# Patient Record
Sex: FEMALE | Race: OTHER | HISPANIC OR LATINO
[De-identification: names, ages, dates, MRNs, and addresses within clinical notes are randomized per-mention and may not be internally consistent; named-entity substitution may affect disease eponyms.]

---

## 2018-01-02 ENCOUNTER — TRANSCRIPTION ENCOUNTER (OUTPATIENT)
Age: 43
End: 2018-01-02

## 2018-02-03 ENCOUNTER — TRANSCRIPTION ENCOUNTER (OUTPATIENT)
Age: 43
End: 2018-02-03

## 2018-12-09 ENCOUNTER — TRANSCRIPTION ENCOUNTER (OUTPATIENT)
Age: 43
End: 2018-12-09

## 2019-03-07 ENCOUNTER — EMERGENCY (EMERGENCY)
Facility: HOSPITAL | Age: 44
LOS: 1 days | Discharge: HOME | End: 2019-03-07
Attending: EMERGENCY MEDICINE
Payer: SELF-PAY

## 2019-03-07 VITALS
WEIGHT: 119.93 LBS | OXYGEN SATURATION: 100 % | RESPIRATION RATE: 16 BRPM | HEART RATE: 74 BPM | DIASTOLIC BLOOD PRESSURE: 62 MMHG | HEIGHT: 61 IN | TEMPERATURE: 98 F | SYSTOLIC BLOOD PRESSURE: 120 MMHG

## 2019-03-07 LAB
ALBUMIN SERPL ELPH-MCNC: 4.3 G/DL — SIGNIFICANT CHANGE UP (ref 3.3–5.2)
ALP SERPL-CCNC: 60 U/L — SIGNIFICANT CHANGE UP (ref 40–120)
ALT FLD-CCNC: 13 U/L — SIGNIFICANT CHANGE UP
AMYLASE P1 CFR SERPL: 24 U/L — LOW (ref 36–128)
ANION GAP SERPL CALC-SCNC: 11 MMOL/L — SIGNIFICANT CHANGE UP (ref 5–17)
APPEARANCE UR: CLEAR — SIGNIFICANT CHANGE UP
AST SERPL-CCNC: 16 U/L — SIGNIFICANT CHANGE UP
BASOPHILS # BLD AUTO: 0 K/UL — SIGNIFICANT CHANGE UP (ref 0–0.2)
BASOPHILS NFR BLD AUTO: 0.1 % — SIGNIFICANT CHANGE UP (ref 0–2)
BILIRUB SERPL-MCNC: 0.7 MG/DL — SIGNIFICANT CHANGE UP (ref 0.4–2)
BILIRUB UR-MCNC: NEGATIVE — SIGNIFICANT CHANGE UP
BUN SERPL-MCNC: 7 MG/DL — LOW (ref 8–20)
CALCIUM SERPL-MCNC: 9.6 MG/DL — SIGNIFICANT CHANGE UP (ref 8.6–10.2)
CHLORIDE SERPL-SCNC: 105 MMOL/L — SIGNIFICANT CHANGE UP (ref 98–107)
CO2 SERPL-SCNC: 24 MMOL/L — SIGNIFICANT CHANGE UP (ref 22–29)
COLOR SPEC: YELLOW — SIGNIFICANT CHANGE UP
CREAT SERPL-MCNC: 0.53 MG/DL — SIGNIFICANT CHANGE UP (ref 0.5–1.3)
DIFF PNL FLD: NEGATIVE — SIGNIFICANT CHANGE UP
EOSINOPHIL # BLD AUTO: 0 K/UL — SIGNIFICANT CHANGE UP (ref 0–0.5)
EOSINOPHIL NFR BLD AUTO: 0.4 % — SIGNIFICANT CHANGE UP (ref 0–6)
GLUCOSE SERPL-MCNC: 90 MG/DL — SIGNIFICANT CHANGE UP (ref 70–115)
GLUCOSE UR QL: NEGATIVE MG/DL — SIGNIFICANT CHANGE UP
HCG SERPL-ACNC: <4 MIU/ML — SIGNIFICANT CHANGE UP
HCG UR QL: NEGATIVE — SIGNIFICANT CHANGE UP
HCT VFR BLD CALC: 39.1 % — SIGNIFICANT CHANGE UP (ref 37–47)
HGB BLD-MCNC: 13.1 G/DL — SIGNIFICANT CHANGE UP (ref 12–16)
KETONES UR-MCNC: NEGATIVE — SIGNIFICANT CHANGE UP
LEUKOCYTE ESTERASE UR-ACNC: NEGATIVE — SIGNIFICANT CHANGE UP
LIDOCAIN IGE QN: 16 U/L — LOW (ref 22–51)
LYMPHOCYTES # BLD AUTO: 1.7 K/UL — SIGNIFICANT CHANGE UP (ref 1–4.8)
LYMPHOCYTES # BLD AUTO: 12.6 % — LOW (ref 20–55)
MCHC RBC-ENTMCNC: 32.5 PG — HIGH (ref 27–31)
MCHC RBC-ENTMCNC: 33.5 G/DL — SIGNIFICANT CHANGE UP (ref 32–36)
MCV RBC AUTO: 97 FL — SIGNIFICANT CHANGE UP (ref 81–99)
MONOCYTES # BLD AUTO: 0.9 K/UL — HIGH (ref 0–0.8)
MONOCYTES NFR BLD AUTO: 6.5 % — SIGNIFICANT CHANGE UP (ref 3–10)
NEUTROPHILS # BLD AUTO: 10.7 K/UL — HIGH (ref 1.8–8)
NEUTROPHILS NFR BLD AUTO: 80 % — HIGH (ref 37–73)
NITRITE UR-MCNC: NEGATIVE — SIGNIFICANT CHANGE UP
PH UR: 7 — SIGNIFICANT CHANGE UP (ref 5–8)
PLATELET # BLD AUTO: 294 K/UL — SIGNIFICANT CHANGE UP (ref 150–400)
POTASSIUM SERPL-MCNC: 4 MMOL/L — SIGNIFICANT CHANGE UP (ref 3.5–5.3)
POTASSIUM SERPL-SCNC: 4 MMOL/L — SIGNIFICANT CHANGE UP (ref 3.5–5.3)
PROT SERPL-MCNC: 7.1 G/DL — SIGNIFICANT CHANGE UP (ref 6.6–8.7)
PROT UR-MCNC: NEGATIVE MG/DL — SIGNIFICANT CHANGE UP
RBC # BLD: 4.03 M/UL — LOW (ref 4.4–5.2)
RBC # FLD: 13.1 % — SIGNIFICANT CHANGE UP (ref 11–15.6)
SODIUM SERPL-SCNC: 140 MMOL/L — SIGNIFICANT CHANGE UP (ref 135–145)
SP GR SPEC: 1 — LOW (ref 1.01–1.02)
UROBILINOGEN FLD QL: NEGATIVE MG/DL — SIGNIFICANT CHANGE UP
WBC # BLD: 13.4 K/UL — HIGH (ref 4.8–10.8)
WBC # FLD AUTO: 13.4 K/UL — HIGH (ref 4.8–10.8)

## 2019-03-07 PROCEDURE — 96375 TX/PRO/DX INJ NEW DRUG ADDON: CPT

## 2019-03-07 PROCEDURE — 84702 CHORIONIC GONADOTROPIN TEST: CPT

## 2019-03-07 PROCEDURE — 99284 EMERGENCY DEPT VISIT MOD MDM: CPT | Mod: 25

## 2019-03-07 PROCEDURE — 99284 EMERGENCY DEPT VISIT MOD MDM: CPT

## 2019-03-07 PROCEDURE — 76830 TRANSVAGINAL US NON-OB: CPT | Mod: 26

## 2019-03-07 PROCEDURE — 96374 THER/PROPH/DIAG INJ IV PUSH: CPT | Mod: XU

## 2019-03-07 PROCEDURE — 82150 ASSAY OF AMYLASE: CPT

## 2019-03-07 PROCEDURE — 81003 URINALYSIS AUTO W/O SCOPE: CPT

## 2019-03-07 PROCEDURE — 76830 TRANSVAGINAL US NON-OB: CPT

## 2019-03-07 PROCEDURE — 81025 URINE PREGNANCY TEST: CPT

## 2019-03-07 PROCEDURE — 83690 ASSAY OF LIPASE: CPT

## 2019-03-07 PROCEDURE — 80053 COMPREHEN METABOLIC PANEL: CPT

## 2019-03-07 PROCEDURE — 85027 COMPLETE CBC AUTOMATED: CPT

## 2019-03-07 PROCEDURE — 96361 HYDRATE IV INFUSION ADD-ON: CPT

## 2019-03-07 PROCEDURE — 36415 COLL VENOUS BLD VENIPUNCTURE: CPT

## 2019-03-07 PROCEDURE — 76856 US EXAM PELVIC COMPLETE: CPT | Mod: 26

## 2019-03-07 PROCEDURE — 76856 US EXAM PELVIC COMPLETE: CPT

## 2019-03-07 PROCEDURE — 74177 CT ABD & PELVIS W/CONTRAST: CPT | Mod: 26

## 2019-03-07 PROCEDURE — 74177 CT ABD & PELVIS W/CONTRAST: CPT

## 2019-03-07 RX ORDER — SODIUM CHLORIDE 9 MG/ML
1000 INJECTION INTRAMUSCULAR; INTRAVENOUS; SUBCUTANEOUS ONCE
Qty: 0 | Refills: 0 | Status: COMPLETED | OUTPATIENT
Start: 2019-03-07 | End: 2019-03-07

## 2019-03-07 RX ORDER — ONDANSETRON 8 MG/1
4 TABLET, FILM COATED ORAL ONCE
Qty: 0 | Refills: 0 | Status: COMPLETED | OUTPATIENT
Start: 2019-03-07 | End: 2019-03-07

## 2019-03-07 RX ORDER — FAMOTIDINE 10 MG/ML
20 INJECTION INTRAVENOUS ONCE
Qty: 0 | Refills: 0 | Status: COMPLETED | OUTPATIENT
Start: 2019-03-07 | End: 2019-03-07

## 2019-03-07 RX ADMIN — ONDANSETRON 4 MILLIGRAM(S): 8 TABLET, FILM COATED ORAL at 11:41

## 2019-03-07 RX ADMIN — SODIUM CHLORIDE 1000 MILLILITER(S): 9 INJECTION INTRAMUSCULAR; INTRAVENOUS; SUBCUTANEOUS at 11:42

## 2019-03-07 RX ADMIN — SODIUM CHLORIDE 1000 MILLILITER(S): 9 INJECTION INTRAMUSCULAR; INTRAVENOUS; SUBCUTANEOUS at 13:11

## 2019-03-07 RX ADMIN — FAMOTIDINE 20 MILLIGRAM(S): 10 INJECTION INTRAVENOUS at 11:42

## 2019-03-07 NOTE — ED STATDOCS - NEUROLOGICAL, MLM
sensation is normal and strength is normal. sensation is normal and strength is normal. aox3, cn 2-12 intact

## 2019-03-07 NOTE — ED STATDOCS - OBJECTIVE STATEMENT
Pt is a 44 y/o F prsenting to the ED with right side lowera ab dpain fo the last two days. Pt states the pain has been progressively worsening. However, she notes an intense episode of pain earlier this morining upon voiding urine, prompting her visit to the ED. Associated dysuria. Denies fever, chills, N/V, vaginal discharge, and vaginal bleeding. Pt states that she suffered a fetal demise at 5 months last year and has since been having bowel issues. Had been having diarrhea and constipation but notes a normal BM today. LMP two weeks ago and pt denies pregnancy. Pt is a 44 y/o F presenting to the ED with right side lower abd pain fo the last two days. Pt states the pain has been progressively worsening. However, she notes an intense episode of pain earlier this morning upon voiding urine, prompting her visit to the ED. Associated dysuria. Denies fever, chills, N/V, vaginal discharge, and vaginal bleeding. Pt states that she suffered a fetal demise at 5 months last year and has since been having bowel issues. Had been having diarrhea and constipation but notes a normal BM today. LMP two weeks ago and pt denies pregnancy.

## 2019-03-07 NOTE — ED ADULT NURSE NOTE - OBJECTIVE STATEMENT
Pt presents with c/o of lower abd pain that started about 6 months ago aFTER pt had miscarriage. pt reports that last night pain was severe and did not go away, decided to come to the Ed for treatment. Pt denies chest pain, resp even and unlabored, in nad.

## 2019-03-07 NOTE — ED ADULT TRIAGE NOTE - CHIEF COMPLAINT QUOTE
'I have lower abdominal pain that paralyzes me, it comes and goes usually last 2hrs but today it lasted longer and I took 600mg of Motrin and it helped some." Pt denies pain or burning with urination but states today she felt a lot of pressure.  LMP was 2/14/19

## 2019-03-07 NOTE — ED STATDOCS - GASTROINTESTINAL, MLM
abdomen is tender in the suprapubic, right and left mid abd. no CVAT, soft, and non-distended. Bowel sounds present.

## 2019-03-07 NOTE — ED STATDOCS - CLINICAL SUMMARY MEDICAL DECISION MAKING FREE TEXT BOX
Suspect colitis vs diverticulitis vs UTI. Plan labs, CT A&P with and without contrast, IV abx, pepcid, zofran, and UA.

## 2019-03-07 NOTE — ED STATDOCS - PROGRESS NOTE DETAILS
I performed the initial face to face bedside interview with this patient regarding history of present illness, review of symptoms and past medical, social and family history.  I completed an independent physical examination.  I was the initial provider who evaluated this patient.  The history, review of symptoms and examination was documented by the scribe in my presence and I attest to the accuracy of the documentation.  I have signed out the follow up of any pending tests (i.e. labs, radiological studies) to the PA.  I have discussed the patient’s plan of care and disposition with the PA. KAREN JEAN: PT evaluated by intake physician. HPI/PE/ROS as noted above. Will follow up plan per intake physician FEELING BETTER. results explained to patient who states that she will fu with GYN

## 2019-04-08 NOTE — ED STATDOCS - ATTESTATION, MLM
Closure device inserted into the right femoral artery.  I have reviewed and confirmed nurses' notes for patient's medications, allergies, medical history, and surgical history.

## 2020-06-04 ENCOUNTER — EMERGENCY (EMERGENCY)
Facility: HOSPITAL | Age: 45
LOS: 1 days | Discharge: HOME | End: 2020-06-04
Attending: EMERGENCY MEDICINE
Payer: SELF-PAY

## 2020-06-04 VITALS
DIASTOLIC BLOOD PRESSURE: 77 MMHG | SYSTOLIC BLOOD PRESSURE: 128 MMHG | TEMPERATURE: 98 F | RESPIRATION RATE: 20 BRPM | HEIGHT: 62 IN | OXYGEN SATURATION: 98 % | WEIGHT: 132.06 LBS | HEART RATE: 67 BPM

## 2020-06-04 PROCEDURE — 99284 EMERGENCY DEPT VISIT MOD MDM: CPT

## 2020-06-04 PROCEDURE — 99283 EMERGENCY DEPT VISIT LOW MDM: CPT | Mod: 25

## 2020-06-04 PROCEDURE — 96372 THER/PROPH/DIAG INJ SC/IM: CPT

## 2020-06-04 RX ORDER — LIDOCAINE 4 G/100G
1 CREAM TOPICAL ONCE
Refills: 0 | Status: COMPLETED | OUTPATIENT
Start: 2020-06-04 | End: 2020-06-04

## 2020-06-04 RX ORDER — DEXAMETHASONE 0.5 MG/5ML
8 ELIXIR ORAL ONCE
Refills: 0 | Status: COMPLETED | OUTPATIENT
Start: 2020-06-04 | End: 2020-06-04

## 2020-06-04 RX ORDER — KETOROLAC TROMETHAMINE 30 MG/ML
60 SYRINGE (ML) INJECTION ONCE
Refills: 0 | Status: DISCONTINUED | OUTPATIENT
Start: 2020-06-04 | End: 2020-06-04

## 2020-06-04 RX ADMIN — Medication 8 MILLIGRAM(S): at 09:44

## 2020-06-04 RX ADMIN — Medication 60 MILLIGRAM(S): at 09:44

## 2020-06-04 RX ADMIN — LIDOCAINE 1 PATCH: 4 CREAM TOPICAL at 09:44

## 2020-06-04 NOTE — ED PROVIDER NOTE - OBJECTIVE STATEMENT
44 year old female with a hx of lumbar disc herniations in 2007, presents to the ED c/o L buttock pain radiating down her leg over the past week. She has had this before. There is no recent trauma. 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 Patient denies urine/stool incontinence. Motrin at home provided no relief. Last dose was yesterday. Patient is able to walk but has discomfort.

## 2020-06-04 NOTE — ED ADULT NURSE NOTE - NSIMPLEMENTINTERV_GEN_ALL_ED
Implemented All Universal Safety Interventions:  [place] to call system. Call bell, personal items and telephone within reach. Instruct patient to call for assistance. Room bathroom lighting operational. Non-slip footwear when patient is off stretcher. Physically safe environment: no spills, clutter or unnecessary equipment. Stretcher in lowest position, wheels locked, appropriate side rails in place.

## 2020-06-04 NOTE — ED ADULT NURSE NOTE - PAIN: BODY LOCATION
Patient has been having low grade temp. 99.2-99.6 all last week. She now has a little sinus drainage clear. No cough no shortness of breath no breathing problems. She has no other symptoms. She is a little concerned as when she took some tylenol it did not go down to 98.6 which her normal. Informed her body is telling her it is fighting something. Keep drinking fluids. She does have a little pressure under her eyes. Encouraged to Netti pit  Over the counter Claritin, Zertec. Patient states understanding. Informed message will be given to Dr. Haley please advise   back/gluteal

## 2020-06-04 NOTE — ED PROVIDER NOTE - NSFOLLOWUPINSTRUCTIONS_ED_ALL_ED_FT
Buy "SALONPAS" at the pharmacy. Apply one patch to the area of pain. Keep on for 12 hours then remove. You can apply a new patch 12 hours after you have removed the first patch.  Medrol Dosepack: Follow instructions on packette. This medication is to decrease inflammation in the sciatic nerve  Naprosyn: This is also to decrease inflammation. It is coated to protect your stomach from irritation. Take one pill twice a day. I recommend taking it with a small amount of food.  Follow up with a doctor if not improving. Our doctor on call today is Dr. Pearson. [phone]

## 2020-06-04 NOTE — ED PROVIDER NOTE - MUSCULOSKELETAL, MLM
Spine appears normal. TTP L sacral sulcus, palpation over area reproduces pain, NVI. Range of motion is not limited. No muscle or joint tenderness

## 2020-06-04 NOTE — ED PROVIDER NOTE - PATIENT PORTAL LINK FT
You can access the FollowMyHealth Patient Portal offered by [facility] by registering at the following website: [email or web address]. By joining [employer]’s FollowMyHealth portal, you will also be able to view your health information using other applications (apps) compatible with our system.

## 2020-06-04 NOTE — ED ADULT NURSE NOTE - NS ED NURSE LEVEL OF CONSCIOUSNESS MENTAL STATUS
Verified patient with two types of identifiers. Verified medications with the patient. Verified pharmacy with patient. Per JASON Cruz  discontinued all medications not taken. Cooperative/Awake/Alert

## 2021-12-20 ENCOUNTER — EMERGENCY (EMERGENCY)
Facility: HOSPITAL | Age: 46
LOS: 1 days | Discharge: HOME | End: 2021-12-20
Payer: COMMERCIAL

## 2021-12-20 VITALS
HEIGHT: 62 IN | RESPIRATION RATE: 20 BRPM | OXYGEN SATURATION: 99 % | TEMPERATURE: 99 F | SYSTOLIC BLOOD PRESSURE: 133 MMHG | WEIGHT: 119.93 LBS | DIASTOLIC BLOOD PRESSURE: 78 MMHG | HEART RATE: 84 BPM

## 2021-12-20 PROCEDURE — U0003: CPT

## 2021-12-20 PROCEDURE — 99284 EMERGENCY DEPT VISIT MOD MDM: CPT

## 2021-12-20 PROCEDURE — 71046 X-RAY EXAM CHEST 2 VIEWS: CPT

## 2021-12-20 PROCEDURE — U0005: CPT

## 2021-12-20 PROCEDURE — 99283 EMERGENCY DEPT VISIT LOW MDM: CPT | Mod: 25

## 2021-12-20 PROCEDURE — 71046 X-RAY EXAM CHEST 2 VIEWS: CPT | Mod: 26

## 2021-12-20 RX ADMIN — Medication 100 MILLIGRAM(S): at 19:13

## 2021-12-20 NOTE — ED PROVIDER NOTE - OBJECTIVE STATEMENT
45 y/o female presents c/o one month of non productive cough and several days of right ear pain. Has had negative covid test recently.

## 2021-12-20 NOTE — ED ADULT TRIAGE NOTE - CHIEF COMPLAINT QUOTE
Pt arrives to ED c/o R ear pain and cough for one month . Had a negative COVID test on Thursday but son tested positive for COVID on Friday

## 2021-12-20 NOTE — ED PROVIDER NOTE - NSFOLLOWUPINSTRUCTIONS_ED_ALL_ED_FT
Pneumonia    Pneumonia is an infection of the lungs. Pneumonia may be caused by bacteria, viruses, or funguses. Symptoms include coughing, fever, chest pain when breathing deeply or coughing, shortness of breath, fatigue, or muscle aches. Pneumonia can be diagnosed with a medical history and physical exam, as well as other tests which may include a chest X-ray. If you were prescribed an antibiotic medicine, take it as told by your health care provider and do not stop taking the antibiotic even if you start to feel better. Do not use tobacco products, including cigarettes, chewing tobacco, and e-cigarettes.    SEEK IMMEDIATE MEDICAL CARE IF YOU HAVE ANY OF THE FOLLOWING SYMPTOMS: worsening shortness of breath, worsening chest pain, coughing up blood, change in mental status, lightheadedness/dizziness.     Please follow up with your doctor within 48 hours.

## 2021-12-20 NOTE — ED PROVIDER NOTE - CLINICAL SUMMARY MEDICAL DECISION MAKING FREE TEXT BOX
cough x one month ? right lower infiltrate on x-ray  will send covid testing, will tx with abx, PT advised of importance of follow up with pmd for further evaluation of chronic cough due to h/o smoking   ED return precautions discussed.

## 2021-12-20 NOTE — ED PROVIDER NOTE - PATIENT PORTAL LINK FT
You can access the FollowMyHealth Patient Portal offered by [facility] by registering at the following website: [email or web address]. By joining [employer]’s FollowMyHealth portal, you will also be able to view your health information using other applications (apps) compatible with our system.

## 2021-12-21 LAB — SARS-COV-2 RNA SPEC QL NAA+PROBE: SIGNIFICANT CHANGE UP

## 2022-02-16 ENCOUNTER — TRANSCRIPTION ENCOUNTER (OUTPATIENT)
Age: 47
End: 2022-02-16

## 2022-05-14 ENCOUNTER — EMERGENCY (EMERGENCY)
Facility: HOSPITAL | Age: 47
LOS: 1 days | Discharge: HOME | End: 2022-05-14
Attending: EMERGENCY MEDICINE
Payer: SELF-PAY

## 2022-05-14 VITALS
OXYGEN SATURATION: 100 % | HEART RATE: 72 BPM | WEIGHT: 126.99 LBS | DIASTOLIC BLOOD PRESSURE: 53 MMHG | TEMPERATURE: 98 F | SYSTOLIC BLOOD PRESSURE: 106 MMHG | HEIGHT: 61 IN | RESPIRATION RATE: 18 BRPM

## 2022-05-14 PROCEDURE — 99283 EMERGENCY DEPT VISIT LOW MDM: CPT

## 2022-05-14 RX ORDER — ACETAMINOPHEN 500 MG
650 TABLET ORAL ONCE
Refills: 0 | Status: DISCONTINUED | OUTPATIENT
Start: 2022-05-14 | End: 2022-05-18

## 2022-05-14 NOTE — ED ADULT TRIAGE NOTE - CHIEF COMPLAINT QUOTE
pt c/o "pinch like feeling " to right arm since December  A&Ox3, resp wnl, happened after tightening a bolt

## 2022-05-14 NOTE — ED PROVIDER NOTE - OBJECTIVE STATEMENT
47 yo female presenting to the Er with right wrist and elbow pain since december after tightening screws. reports that pain goes from the first finger to the wrist up to the elbow and now intermittently into the neck, pain worse at end of day, no medication taken for relief of symptoms, relief to wrist when pulling on wrist, stating that she feels like "a pop" which gives her relief. right hand dominant. reports some tingling to the fingers intermittently. denies new accidents or injuries. denies fever chills.

## 2022-05-14 NOTE — ED PROVIDER NOTE - NSFOLLOWUPINSTRUCTIONS_ED_ALL_ED_FT
ice wrists and elbow  alternate with tylenol and NSAID for pain control   please follow with primary care doctor and ORTHOPEDIC specialist

## 2022-05-14 NOTE — ED PROVIDER NOTE - PRO INTERPRETER NEED 2
English Protopic Counseling: Patient may experience a mild burning sensation during topical application. Protopic is not approved in children less than 2 years of age. There have been case reports of hematologic and skin malignancies in patients using topical calcineurin inhibitors although causality is questionable.

## 2022-05-14 NOTE — ED PROVIDER NOTE - ATTENDING APP SHARED VISIT CONTRIBUTION OF CARE
non traumatic wrist pain exacerbated by certain position and computer use  pe as doc  agree with out pt fu and splint application fopr comfort

## 2022-05-14 NOTE — ED PROVIDER NOTE - CARE PROVIDER_API CALL
Anthony Dennis)  Orthopaedic Surgery; Surgery of the Hand  [address] [place] [address]  Phone: [phone]  Fax: [phone]  Follow Up Time: Routine

## 2022-05-14 NOTE — ED PROVIDER NOTE - PATIENT PORTAL LINK FT
You can access the FollowMyHealth Patient Portal offered by [facility] by registering at the following website: [email or web address]. By joining [employer]’s FollowMyHealth portal, you will also be able to view your health information using other applications (apps) compatible with our system.

## 2022-05-14 NOTE — ED PROVIDER NOTE - NS ED ATTENDING STATEMENT MOD
This was a shared visit with the TARYN. I reviewed and verified the documentation and independently performed the documented:

## 2022-05-14 NOTE — ED PROVIDER NOTE - PHYSICAL EXAMINATION
Gen: Well appearing in NAD  Head: NC/AT  Neck: trachea midline  Resp:  No distress  Ext: no deformities  + TTP over the right volar aspectopf wrist.+ tinnels signs. + sensation to distal finger tiopos 4/5  strength. + ttp voer lateral epicondyle   SPINE no midline or pt vertebral tenderness  Neuro:  A&O appears non focal  Skin:  Warm and dry as visualized  Psych:  Normal affect and mood

## 2022-05-14 NOTE — ED PROVIDER NOTE - CLINICAL SUMMARY MEDICAL DECISION MAKING FREE TEXT BOX
right wrist and elbow pain since December, nontoxic appearing PMS intact, no secondary signs of infection. tylenol for pain control. thumb spica and dc with fu with hand

## 2022-09-18 NOTE — ED ADULT NURSE NOTE - GENITOURINARY ASSESSMENT
Our Emergency Department Referral Coordinators will be reaching out to you in the next 24-48 hours from 9:00am to 5:00pm with a follow up appointment. Please expect a phone call from the hospital in that time frame. If you do not receive a call or if you have any questions or concerns, you can reach them at [phone] or [phone].          Diverticulitis    Diverticulitis is inflammation or infection of small pouches in your colon that form when you HAVE a condition called diverticulosis. This condition can range from mild to severe potentially leading to perforation or obstructions of your colon. Symptoms include abdominal pain, fever/chills, nausea, vomiting, diarrhea, constipation, or blood in your stool. If you were prescribed an antibiotic medicine, take it as told by your health care provider. Do not stop taking the antibiotic even if you start to feel better.    SEEK IMMEDIATE MEDICAL CARE IF YOU HAVE ANY OF THE FOLLOWING SYMPTOMS: worsening abdominal pain, high fever, inability to hold down liquids or medication, black or bloody stools, inability to pass gas, lightheadedness/dizziness, or a change in mental status. WDL

## 2022-10-15 NOTE — ED ADULT TRIAGE NOTE - PATIENT ON (OXYGEN DELIVERY METHOD)
Asthma with sepsis, multiple comorbidities & advance age
room air
Yes - the patient is able to be screened

## 2023-10-25 ENCOUNTER — EMERGENCY (EMERGENCY)
Facility: HOSPITAL | Age: 48
LOS: 1 days | Discharge: HOME | End: 2023-10-25
Attending: EMERGENCY MEDICINE
Payer: COMMERCIAL

## 2023-10-25 VITALS
TEMPERATURE: 98 F | SYSTOLIC BLOOD PRESSURE: 134 MMHG | HEIGHT: 61 IN | HEART RATE: 68 BPM | WEIGHT: 138.01 LBS | DIASTOLIC BLOOD PRESSURE: 81 MMHG | RESPIRATION RATE: 20 BRPM | OXYGEN SATURATION: 98 %

## 2023-10-25 PROCEDURE — 99284 EMERGENCY DEPT VISIT MOD MDM: CPT

## 2023-10-25 RX ORDER — METHOCARBAMOL 500 MG/1
2 TABLET, FILM COATED ORAL
Qty: 24 | Refills: 0
Start: 2023-10-25 | End: 2023-10-28

## 2023-10-25 RX ORDER — IBUPROFEN 200 MG
1 TABLET ORAL
Qty: 20 | Refills: 0
Start: 2023-10-25 | End: 2023-10-29

## 2023-10-25 RX ORDER — LIDOCAINE 4 G/100G
1 CREAM TOPICAL
Qty: 1 | Refills: 0
Start: 2023-10-25 | End: 2023-10-29

## 2023-10-25 NOTE — ED PROVIDER NOTE - NSFOLLOWUPINSTRUCTIONS_ED_ALL_ED_FT
Patient education: Low back pain – Discharge instructions (The Basics)  Written by the doctors and editors at [facility]  Please read the Disclaimer at the end of this page.    What are discharge instructions?  Discharge instructions are information about how to take care of yourself after getting medical care for a health problem.    What is low back pain?  Low back pain is pain or discomfort in the lower part of your back. Many people have low back pain at some point, and it most often gets better on its own. Many different things can cause low back pain. Most of the time, doctors do not know the exact cause.    Back pain can happen if you strain a muscle. This is often what has happened when a person "throws out" their back. This refers to pain that starts suddenly after physical activity, like lifting something heavy or bending the back.    Back pain can also happen if you have (figure 1):    ?Damaged, bulging, or torn discs    ?Arthritis affecting the joints of the spine    ?Bony growths on the vertebrae that crowd nearby nerves    ?A "compression fracture" due to osteoporosis (a condition that makes your bones weak)    ?A vertebra out of place    ?Narrowing in the spinal canal    ?A tumor or infection (but this is very rare)    How do I care for myself at home?  Ask the doctor or nurse what you should do when you go home. Make sure that you understand exactly what you need to do to care for yourself. Ask questions if there is anything you do not understand.    You should also:    ?Use heat on your back for short periods of time, if it helps with pain. Put a heating pad (on the low setting) on your back for 20 minutes at a time a few times each day. Never go to sleep with heat on your back.    ?Try to stay as active as you can without causing too much pain, if your doctor or nurse said that it is OK. If your pain is severe, you might need to rest for a day or 2. But it's important to get back to walking and moving as soon as possible. Try to keep doing your normal daily activities. Get up and move around gently during the day as you are able.    ?Slowly start to increase your activity level as you are able to. If something causes your pain to come back or get worse, stop and go back to doing easier activities that did not hurt.    ?Avoid sitting or standing in 1 position for a long time. You might want to sleep with a pillow under or between your knees if this eases your pain.    ?Take a medicine like ibuprofen (sample brand names: Advil, Motrin) or naproxen (brand name: Aleve) for pain, if needed. These are nonsteroidal antiinflammatory drugs ("NSAIDs"). They might work better than acetaminophen for low back pain.    ?Talk to your doctor or nurse before trying any of the following. These treatments might help you feel better for a little while:    •Spinal manipulation – This is when a chiropractor, physical therapist, or other professional moves or "adjusts" the joints of your back.    •Acupuncture – This is when someone who knows traditional Chinese medicine inserts tiny needles through your skin to block pain signals.    •Massage therapy – The massage therapist manipulates your muscles and soft tissues to decrease muscle tension and increase relaxation.    What follow-up care do I need?  Your doctor or nurse will tell you if you need to make a follow-up appointment. If so, make sure that you know when and where to go. The doctor might suggest that you see a physical therapist to learn exercises to help with your back pain.    When should I call the doctor?  Call for emergency help right away (in the [place] and [place], call 9-1-1) if:    ?You are unable to walk, or cannot control your bowels or bladder.    ?You develop a fever of 100.4°F (38°C) or higher, chills, or night sweats.    Call your doctor for advice if:    ?Your legs are numb, weak, or tingly.    ?Your pain is getting worse, even with medicines and rest.    ?You develop a new rash.

## 2023-10-25 NOTE — ED PROVIDER NOTE - OBJECTIVE STATEMENT
PT with SPMHX of  lower back pain   presents to the ED with complaint of acute on chronic lower back pain. Pt states that she has been having pain in her lower back for yrs and over the last 3 wks it has been worse wo inciting incident. Pt states that pain is moderate, dull, radiates down her Lt leg, made worse with activity improved with IBU that she has not been taking consistently. PT denies fever, chills, numbness tingling, loss of sensation saddle anesthesia, weakness, HE, loss of control of urine, or bowels IVDA.

## 2023-10-25 NOTE — ED PROVIDER NOTE - ADDITIONAL NOTES AND INSTRUCTIONS:
PT was evaluated At [facility] ED and was found to have a condition that warranted time of to rest and heal from WORK/SCHOOL.   Tyshawn Fitch PA-C

## 2023-10-25 NOTE — ED PROVIDER NOTE - NS ED ROS FT
ROS: CONTUSIONAL: Denies fever, chills, fatigue, wt loss. HEAD: Denies trauma, HA, Dizziness. EYE: Denies Acute visual changes, diplopia. ENMT: Denies change in hearing, tinnitus, epistaxis, difficulty swallowing, sore throat. CARDIO: Denies CP, palpitations, edema. RESP: Denies Cough, SOB , Diff breathing, hemoptysis. GI: Denies N/V, ABD pain, change in bowel movement. URINARY: Denies difficulty urinating, pelvic pain. MS:  back pain Denies joint pain, weakness, decreased ROM, swelling. NEURO: Denies change in gait, seizures, loss of sensation, dizziness, confusion LOC.  PSY: NO SI/HI. SKIN: Denies Rash, bruising.

## 2023-10-25 NOTE — ED PROVIDER NOTE - CLINICAL SUMMARY MEDICAL DECISION MAKING FREE TEXT BOX
Pt is following up on PSA lab result. Pt is aware that Dr. Antonino Booth is out of office.   Please call
Results emailed to patient
PT with SPMHX of  lower back pain   presents to the ED with complaint of acute on chronic lower back pain. Pt states that she has been having pain in her lower back for yrs and over the last 3 wks it has been worse wo inciting incident. Pt states that pain is moderate, dull, radiates down her Lt leg, made worse with activity improved with IBU that she has not been taking consistently. PT denies fever, chills, numbness tingling, loss of sensation saddle anesthesia, weakness, HE, loss of control of urine, or bowels IVDA. ON exam Lumbar spine: Lt paraspinal ttp, spasm of muscles. no midline ttp, MELANIE, strength intact, no palpable colin abnormality. NEURO A&O x 3, CN II-Xii GI, MAEx 4,  5/5/ motors, = sensation, no pronator drift or ataxia. Pt with davisley radicular pain, neuro vasc itnact, will tx with trial of PO meds, follow up to spinal team, Pt educated about when to return to the ED if needed. PT verbalizes that he understands all instructions and results. Pt informed that ED is open and available 24/7 365 days a yr, encouraged to return to the ED if they have any change in condition, or feel the need for revaluation.

## 2023-10-25 NOTE — ED ADULT TRIAGE NOTE - NS ED NURSE BANDS TYPE
[FreeTextEntry1] : Ms. CHÁVEZ is a 80 year year old  female who  returns today in follow up with regard to a history of type 2 diabetes mellitus.  No known microvascular complications.  Has recently  met with dietitian and has made diet and lifestyle changes.\par Sodium low  chronically  Current dm medication include  Janumet 50/1,000 mg bid  and glimepiride 1 mg 1/2 tab daily in am She  denies any chest pain, sob, neurologic or ophthalmologic complaints. She  too denies any new podiatric concerns. She  is up to date with his ophthalmologic visit.\par Additional medical history includes that of hyperlipidemia and hypertension, gerd and anxiety.  She is on Pravastatin along with enalapril and omeprazole.She too is on D3 2,000 iu daily and b-12 1,0000 mcg daily and co q 10 200 mg. daily\par She to has hx of iron defic anemia-stable -follows with Dr. Tran  of hematology. A1c last April ws 6.9%. Bg's of late 160-200 range-more fruit of late.\par \par \par  Name band;

## 2023-10-25 NOTE — ED PROVIDER NOTE - NSCAREINITIATED _GEN_ER
Tyshawn Fitch) Rifampin Counseling: I discussed with the patient the risks of rifampin including but not limited to liver damage, kidney damage, red-orange body fluids, nausea/vomiting and severe allergy.

## 2023-10-25 NOTE — ED PROVIDER NOTE - MUSCULOSKELETAL, MLM
Lumbar spine: Lt paraspinal ttp, spasm of muscles. no midline ttp, MELANIE, strength intact, no palpable colin abnormality. NEURO A&O x 3, CN II-Xii GI, MAEx 4,  5/5/ motors, = sensation, no pronator drift or ataxia.

## 2023-10-25 NOTE — ED PROVIDER NOTE - PATIENT PORTAL LINK FT
You can access the FollowMyHealth Patient Portal offered by [facility] by registering at the following website: [email or web address]. By joining [employer]’s FollowMyHealth portal, you will also be able to view your health information using other applications (apps) compatible with our system.

## 2023-10-25 NOTE — ED PROVIDER NOTE - ATTENDING APP SHARED VISIT CONTRIBUTION OF CARE
Keo: I performed a face to face bedside interview with patient regarding history of present illness, review of symptoms and past medical history. I completed an independent physical exam.  I have discussed patient's plan of care with advanced care provider.   I agree with note as stated above including HISTORY OF PRESENT ILLNESS, HIV, PAST MEDICAL/SURGICAL/FAMILY/SOCIAL HISTORY, ALLERGIES AND HOME MEDICATIONS, REVIEW OF SYSTEMS, PHYSICAL EXAM, MEDICAL DECISION MAKING and any PROGRESS NOTES during the time I functioned as the attending physician for this patient  unless otherwise noted. My brief assessment is as follows: hx sciatica c/o 2 weeks of left lower back pain down left leg. no tingling/numbness/weakness. no f/c. no trauma. no midline pain. has had hx of same. no retention/paresthesias. non toxic, well appearing, ctab, rrr, abd benign, no midline ttp. 5/5 strength b/l, nl sensation. nl gait. supportive care and f/u.

## 2023-10-25 NOTE — ED PROVIDER NOTE - CARE PROVIDER_API CALL
Troy Jennings  Neurosurgery  [address] [place] [address] [address]  [place] [address]  Phone: [phone]  Fax: [phone]  Follow Up Time:

## 2023-10-27 ENCOUNTER — EMERGENCY (EMERGENCY)
Facility: HOSPITAL | Age: 48
LOS: 1 days | Discharge: HOME | End: 2023-10-27
Attending: EMERGENCY MEDICINE
Payer: COMMERCIAL

## 2023-10-27 VITALS
SYSTOLIC BLOOD PRESSURE: 129 MMHG | OXYGEN SATURATION: 96 % | TEMPERATURE: 98 F | HEART RATE: 62 BPM | RESPIRATION RATE: 19 BRPM | DIASTOLIC BLOOD PRESSURE: 72 MMHG

## 2023-10-27 VITALS
SYSTOLIC BLOOD PRESSURE: 123 MMHG | DIASTOLIC BLOOD PRESSURE: 79 MMHG | HEIGHT: 61 IN | RESPIRATION RATE: 16 BRPM | HEART RATE: 89 BPM | TEMPERATURE: 98 F | OXYGEN SATURATION: 99 % | WEIGHT: 136.91 LBS

## 2023-10-27 LAB
APPEARANCE UR: CLEAR — SIGNIFICANT CHANGE UP
APPEARANCE UR: CLEAR — SIGNIFICANT CHANGE UP
BACTERIA # UR AUTO: ABNORMAL
BACTERIA # UR AUTO: ABNORMAL
BILIRUB UR-MCNC: ABNORMAL
BILIRUB UR-MCNC: ABNORMAL
COLOR SPEC: YELLOW — SIGNIFICANT CHANGE UP
COLOR SPEC: YELLOW — SIGNIFICANT CHANGE UP
DIFF PNL FLD: ABNORMAL
DIFF PNL FLD: ABNORMAL
EPI CELLS # UR: SIGNIFICANT CHANGE UP
EPI CELLS # UR: SIGNIFICANT CHANGE UP
GLUCOSE UR QL: NEGATIVE MG/DL — SIGNIFICANT CHANGE UP
GLUCOSE UR QL: NEGATIVE MG/DL — SIGNIFICANT CHANGE UP
HCG UR QL: NEGATIVE — SIGNIFICANT CHANGE UP
HCG UR QL: NEGATIVE — SIGNIFICANT CHANGE UP
KETONES UR-MCNC: ABNORMAL
KETONES UR-MCNC: ABNORMAL
LEUKOCYTE ESTERASE UR-ACNC: NEGATIVE — SIGNIFICANT CHANGE UP
LEUKOCYTE ESTERASE UR-ACNC: NEGATIVE — SIGNIFICANT CHANGE UP
NITRITE UR-MCNC: NEGATIVE — SIGNIFICANT CHANGE UP
NITRITE UR-MCNC: NEGATIVE — SIGNIFICANT CHANGE UP
PH UR: 5 — SIGNIFICANT CHANGE UP (ref 5–8)
PH UR: 5 — SIGNIFICANT CHANGE UP (ref 5–8)
PROT UR-MCNC: SIGNIFICANT CHANGE UP MG/DL
PROT UR-MCNC: SIGNIFICANT CHANGE UP MG/DL
RBC CASTS # UR COMP ASSIST: SIGNIFICANT CHANGE UP /HPF (ref 0–4)
RBC CASTS # UR COMP ASSIST: SIGNIFICANT CHANGE UP /HPF (ref 0–4)
SP GR SPEC: 1.02 — SIGNIFICANT CHANGE UP (ref 1.01–1.02)
SP GR SPEC: 1.02 — SIGNIFICANT CHANGE UP (ref 1.01–1.02)
UROBILINOGEN FLD QL: NEGATIVE MG/DL — SIGNIFICANT CHANGE UP
UROBILINOGEN FLD QL: NEGATIVE MG/DL — SIGNIFICANT CHANGE UP
WBC UR QL: SIGNIFICANT CHANGE UP /HPF (ref 0–5)
WBC UR QL: SIGNIFICANT CHANGE UP /HPF (ref 0–5)

## 2023-10-27 PROCEDURE — 72110 X-RAY EXAM L-2 SPINE 4/>VWS: CPT

## 2023-10-27 PROCEDURE — 72131 CT LUMBAR SPINE W/O DYE: CPT | Mod: 26,MF

## 2023-10-27 PROCEDURE — 81001 URINALYSIS AUTO W/SCOPE: CPT

## 2023-10-27 PROCEDURE — 99285 EMERGENCY DEPT VISIT HI MDM: CPT

## 2023-10-27 PROCEDURE — 96374 THER/PROPH/DIAG INJ IV PUSH: CPT

## 2023-10-27 PROCEDURE — G1004: CPT

## 2023-10-27 PROCEDURE — 72110 X-RAY EXAM L-2 SPINE 4/>VWS: CPT | Mod: 26

## 2023-10-27 PROCEDURE — 96375 TX/PRO/DX INJ NEW DRUG ADDON: CPT

## 2023-10-27 PROCEDURE — 72131 CT LUMBAR SPINE W/O DYE: CPT | Mod: MF

## 2023-10-27 PROCEDURE — 81025 URINE PREGNANCY TEST: CPT

## 2023-10-27 PROCEDURE — 99284 EMERGENCY DEPT VISIT MOD MDM: CPT | Mod: 25

## 2023-10-27 RX ORDER — LIDOCAINE 4 G/100G
1 CREAM TOPICAL
Qty: 10 | Refills: 0
Start: 2023-10-27

## 2023-10-27 RX ORDER — METOCLOPRAMIDE HCL 10 MG
10 TABLET ORAL ONCE
Refills: 0 | Status: COMPLETED | OUTPATIENT
Start: 2023-10-27 | End: 2023-10-27

## 2023-10-27 RX ORDER — ONDANSETRON 8 MG/1
4 TABLET, FILM COATED ORAL ONCE
Refills: 0 | Status: COMPLETED | OUTPATIENT
Start: 2023-10-27 | End: 2023-10-27

## 2023-10-27 RX ORDER — HYDROMORPHONE HYDROCHLORIDE 2 MG/ML
0.5 INJECTION INTRAMUSCULAR; INTRAVENOUS; SUBCUTANEOUS ONCE
Refills: 0 | Status: DISCONTINUED | OUTPATIENT
Start: 2023-10-27 | End: 2023-10-27

## 2023-10-27 RX ORDER — LIDOCAINE 4 G/100G
1 CREAM TOPICAL ONCE
Refills: 0 | Status: COMPLETED | OUTPATIENT
Start: 2023-10-27 | End: 2023-10-27

## 2023-10-27 RX ORDER — ONDANSETRON 8 MG/1
4 TABLET, FILM COATED ORAL ONCE
Refills: 0 | Status: DISCONTINUED | OUTPATIENT
Start: 2023-10-27 | End: 2023-10-27

## 2023-10-27 RX ORDER — OXYCODONE AND ACETAMINOPHEN 5; 325 MG/1; MG/1
1 TABLET ORAL
Qty: 10 | Refills: 0
Start: 2023-10-27 | End: 2023-10-30

## 2023-10-27 RX ORDER — SODIUM CHLORIDE 9 MG/ML
1000 INJECTION INTRAMUSCULAR; INTRAVENOUS; SUBCUTANEOUS ONCE
Refills: 0 | Status: COMPLETED | OUTPATIENT
Start: 2023-10-27 | End: 2023-10-27

## 2023-10-27 RX ADMIN — HYDROMORPHONE HYDROCHLORIDE 0.5 MILLIGRAM(S): 2 INJECTION INTRAMUSCULAR; INTRAVENOUS; SUBCUTANEOUS at 15:20

## 2023-10-27 RX ADMIN — SODIUM CHLORIDE 1000 MILLILITER(S): 9 INJECTION INTRAMUSCULAR; INTRAVENOUS; SUBCUTANEOUS at 17:49

## 2023-10-27 RX ADMIN — ONDANSETRON 4 MILLIGRAM(S): 8 TABLET, FILM COATED ORAL at 16:29

## 2023-10-27 RX ADMIN — Medication 10 MILLIGRAM(S): at 17:49

## 2023-10-27 RX ADMIN — LIDOCAINE 1 PATCH: 4 CREAM TOPICAL at 15:20

## 2023-10-27 NOTE — ED STATDOCS - NSFOLLOWUPINSTRUCTIONS_ED_ALL_ED_FT
please do not take the Percocet for tonight  continue ibuprofen and Robaxin as prescribed previously and continue the steroids  take the Percocet tomorrow as needed if the patient gets so severe  : Follow-up with your primary care doctor in 1 to 2 days for possible physical Thearpy referral    follow-up with the spine and pain management  Back Pain    Back pain is very common in adults. The cause of back pain is rarely dangerous and the pain often gets better over time. The cause of your back pain may not be known and may include strain of muscles or ligaments, degeneration of the spinal disks, or arthritis. Occasionally the pain may radiate down your leg(s). Over-the-counter medicines to reduce pain and inflammation are often the most helpful. Stretching and remaining active frequently helps the healing process.     SEEK IMMEDIATE MEDICAL CARE IF YOU HAVE ANY OF THE FOLLOWING SYMPTOMS: bowel or bladder control problems, unusual weakness or numbness in your arms or legs, nausea or vomiting, abdominal pain, fever, dizziness/lightheadedness.

## 2023-10-27 NOTE — ED STATDOCS - PROGRESS NOTE DETAILS
Reports minimal pain relief with pain medications.  L-spine xray complete: no acute abnormalities.  CT lumbar spine ordered. patient seen by Dr. ocasio  Initially agreed with the history physical exam and plan   seen the patient at the bedside after receiving pain medication felt nauseous vomited once given Reglan IV fluids now she feels better. CT scan resulted with osteophyte L4-L5 L5-S1 explained to the patient.  patient ambulatory walking. pain is better with the pain medication. amended follow-up with the spine and pain management she agreed with the plan we will discharge the patient

## 2023-10-27 NOTE — ED ADULT TRIAGE NOTE - CHIEF COMPLAINT QUOTE
Pt with lower back pain for 3 weeks that has slowly become worse. She was seen here 3 days ago and given muscle relaxer, steroids and Motrin that aren't providing relief. Pain is radiating down left leg with numbness and tingling in her left foot when she tried lying down. Denies any bowel or bladder dysfunction.

## 2023-10-27 NOTE — ED STATDOCS - PATIENT PORTAL LINK FT
You can access the FollowMyHealth Patient Portal offered by [facility] by registering at the following website: [email or web address]. By joining [employer]’s FollowMyHealth portal, you will also be able to view your health information using other applications (apps) compatible with our system.

## 2023-10-27 NOTE — ED STATDOCS - CARE PROVIDER_API CALL
Samson Huitron [facility]  Neurosurgery  [address] [place] [address]  Phone: [phone]  Fax: [phone]  Follow Up Time:     Maegan Julian  Pain Medicine  [address] [place] [address]  Phone: [phone]  Fax: [phone]  Follow Up Time:

## 2023-10-27 NOTE — ED ADULT NURSE NOTE - NSFALLUNIVINTERV_ED_ALL_ED
Bed/Stretcher in lowest position, wheels locked, appropriate side rails in place/Call bell, personal items and telephone in reach/Instruct patient to call for assistance before getting out of bed/chair/stretcher/Non-slip footwear applied when patient is off stretcher/[place] to call system/Physically safe environment - no spills, clutter or unnecessary equipment/Purposeful proactive rounding/Room/bathroom lighting operational, light cord in reach

## 2023-10-27 NOTE — ED ADULT NURSE REASSESSMENT NOTE - NS ED NURSE REASSESS COMMENT FT1
patient resting comfortably on stretcher and no acute distress noted at this time, pt waiting for CT scan results.

## 2023-10-27 NOTE — ED ADULT NURSE NOTE - OBJECTIVE STATEMENT
Pt is c/o lower back pain that has been gradually becoming more painful over 3 weeks. Pt was here wednesday and was sent home with a muscle relaxer, motrin and a steroids with no relief. Pt is back with 10/10 lower back pain that radiates down both her legs. Pt has trouble sitting down because her back hurts too much. Pt denies any pmhx and allergies.

## 2023-10-27 NOTE — ED STATDOCS - NS_ ATTENDINGSCRIBEDETAILS _ED_A_ED_FT
I, Ashok Sharma, performed the initial face to face bedside interview with this patient regarding history of present illness, review of symptoms and relevant past medical, social and family history.  I completed an independent physical examination.  I was the provider who initially evaluated this patient.  The history, relevant review of systems, past medical and surgical history, medical decision making, and physical examination was documented by the scribe in my presence and I attest to the accuracy of the documentation. Follow-up on ordered tests (ie labs, radiologic studies) and re-evaluation of the patient's status has been communicated to the ACP.  Disposition of the patient will be based on test outcome and response to ED interventions.

## 2023-10-27 NOTE — ED STATDOCS - OBJECTIVE STATEMENT
46 y/o female with pmhx of 2 herniated disc and chronic back pain, c/o chronic back pain for years which has worsened over the past 3 weeks. Pt came to ED on Wed for same pain, given medication and dc home. Pt states medication hasn't helped with relief pain. Pain is worse when laying down and siting. Pain is most tolerable standing. Pt has radiating numbness and tingling to legs when laying down. Pt is ambulatory.  No fever. Pt doesn't f/u with anyone. Denies heavy lifting. No drinking hx. Denies taking any pain medication for this. No hx of IVDU. Smoker.

## 2023-11-01 ENCOUNTER — APPOINTMENT (OUTPATIENT)
Age: 48
End: 2023-11-01
Payer: MEDICARE

## 2023-11-01 VITALS
WEIGHT: 137 LBS | BODY MASS INDEX: 25.86 KG/M2 | DIASTOLIC BLOOD PRESSURE: 77 MMHG | HEIGHT: 61 IN | HEART RATE: 65 BPM | SYSTOLIC BLOOD PRESSURE: 119 MMHG

## 2023-11-01 PROCEDURE — 99205 OFFICE O/P NEW HI 60 MIN: CPT

## 2023-11-02 ENCOUNTER — EMERGENCY (EMERGENCY)
Facility: HOSPITAL | Age: 48
LOS: 1 days | Discharge: HOME | End: 2023-11-02
Attending: EMERGENCY MEDICINE
Payer: COMMERCIAL

## 2023-11-02 ENCOUNTER — NON-APPOINTMENT (OUTPATIENT)
Age: 48
End: 2023-11-02

## 2023-11-02 VITALS
OXYGEN SATURATION: 100 % | DIASTOLIC BLOOD PRESSURE: 78 MMHG | RESPIRATION RATE: 20 BRPM | SYSTOLIC BLOOD PRESSURE: 120 MMHG | TEMPERATURE: 98 F | WEIGHT: 136.03 LBS | HEART RATE: 95 BPM | HEIGHT: 61 IN

## 2023-11-02 LAB
ALBUMIN SERPL ELPH-MCNC: 4.2 G/DL — SIGNIFICANT CHANGE UP (ref 3.3–5.2)
ALBUMIN SERPL ELPH-MCNC: 4.2 G/DL — SIGNIFICANT CHANGE UP (ref 3.3–5.2)
ALP SERPL-CCNC: 63 U/L — SIGNIFICANT CHANGE UP (ref 40–120)
ALP SERPL-CCNC: 63 U/L — SIGNIFICANT CHANGE UP (ref 40–120)
ALT FLD-CCNC: 35 U/L — HIGH
ALT FLD-CCNC: 35 U/L — HIGH
ANION GAP SERPL CALC-SCNC: 10 MMOL/L — SIGNIFICANT CHANGE UP (ref 5–17)
ANION GAP SERPL CALC-SCNC: 10 MMOL/L — SIGNIFICANT CHANGE UP (ref 5–17)
AST SERPL-CCNC: 33 U/L — HIGH
AST SERPL-CCNC: 33 U/L — HIGH
BASOPHILS # BLD AUTO: 0.04 K/UL — SIGNIFICANT CHANGE UP (ref 0–0.2)
BASOPHILS # BLD AUTO: 0.04 K/UL — SIGNIFICANT CHANGE UP (ref 0–0.2)
BASOPHILS NFR BLD AUTO: 0.4 % — SIGNIFICANT CHANGE UP (ref 0–2)
BASOPHILS NFR BLD AUTO: 0.4 % — SIGNIFICANT CHANGE UP (ref 0–2)
BILIRUB SERPL-MCNC: 0.6 MG/DL — SIGNIFICANT CHANGE UP (ref 0.4–2)
BILIRUB SERPL-MCNC: 0.6 MG/DL — SIGNIFICANT CHANGE UP (ref 0.4–2)
BUN SERPL-MCNC: 6.5 MG/DL — LOW (ref 8–20)
BUN SERPL-MCNC: 6.5 MG/DL — LOW (ref 8–20)
CALCIUM SERPL-MCNC: 9.2 MG/DL — SIGNIFICANT CHANGE UP (ref 8.4–10.5)
CALCIUM SERPL-MCNC: 9.2 MG/DL — SIGNIFICANT CHANGE UP (ref 8.4–10.5)
CHLORIDE SERPL-SCNC: 102 MMOL/L — SIGNIFICANT CHANGE UP (ref 96–108)
CHLORIDE SERPL-SCNC: 102 MMOL/L — SIGNIFICANT CHANGE UP (ref 96–108)
CO2 SERPL-SCNC: 24 MMOL/L — SIGNIFICANT CHANGE UP (ref 22–29)
CO2 SERPL-SCNC: 24 MMOL/L — SIGNIFICANT CHANGE UP (ref 22–29)
CREAT SERPL-MCNC: 0.53 MG/DL — SIGNIFICANT CHANGE UP (ref 0.5–1.3)
CREAT SERPL-MCNC: 0.53 MG/DL — SIGNIFICANT CHANGE UP (ref 0.5–1.3)
CRP SERPL-MCNC: <4 MG/L — SIGNIFICANT CHANGE UP
CRP SERPL-MCNC: <4 MG/L — SIGNIFICANT CHANGE UP
EGFR: 116 ML/MIN/1.73M2 — SIGNIFICANT CHANGE UP
EGFR: 116 ML/MIN/1.73M2 — SIGNIFICANT CHANGE UP
EOSINOPHIL # BLD AUTO: 0.2 K/UL — SIGNIFICANT CHANGE UP (ref 0–0.5)
EOSINOPHIL # BLD AUTO: 0.2 K/UL — SIGNIFICANT CHANGE UP (ref 0–0.5)
EOSINOPHIL NFR BLD AUTO: 1.9 % — SIGNIFICANT CHANGE UP (ref 0–6)
EOSINOPHIL NFR BLD AUTO: 1.9 % — SIGNIFICANT CHANGE UP (ref 0–6)
ERYTHROCYTE [SEDIMENTATION RATE] IN BLOOD: 1 MM/HR — SIGNIFICANT CHANGE UP (ref 0–20)
ERYTHROCYTE [SEDIMENTATION RATE] IN BLOOD: 1 MM/HR — SIGNIFICANT CHANGE UP (ref 0–20)
GLUCOSE SERPL-MCNC: 113 MG/DL — HIGH (ref 70–99)
GLUCOSE SERPL-MCNC: 113 MG/DL — HIGH (ref 70–99)
HCT VFR BLD CALC: 37.7 % — SIGNIFICANT CHANGE UP (ref 34.5–45)
HCT VFR BLD CALC: 37.7 % — SIGNIFICANT CHANGE UP (ref 34.5–45)
HGB BLD-MCNC: 12.4 G/DL — SIGNIFICANT CHANGE UP (ref 11.5–15.5)
HGB BLD-MCNC: 12.4 G/DL — SIGNIFICANT CHANGE UP (ref 11.5–15.5)
IMM GRANULOCYTES NFR BLD AUTO: 1.6 % — HIGH (ref 0–0.9)
IMM GRANULOCYTES NFR BLD AUTO: 1.6 % — HIGH (ref 0–0.9)
LYMPHOCYTES # BLD AUTO: 1.92 K/UL — SIGNIFICANT CHANGE UP (ref 1–3.3)
LYMPHOCYTES # BLD AUTO: 1.92 K/UL — SIGNIFICANT CHANGE UP (ref 1–3.3)
LYMPHOCYTES # BLD AUTO: 18 % — SIGNIFICANT CHANGE UP (ref 13–44)
LYMPHOCYTES # BLD AUTO: 18 % — SIGNIFICANT CHANGE UP (ref 13–44)
MCHC RBC-ENTMCNC: 32.3 PG — SIGNIFICANT CHANGE UP (ref 27–34)
MCHC RBC-ENTMCNC: 32.3 PG — SIGNIFICANT CHANGE UP (ref 27–34)
MCHC RBC-ENTMCNC: 32.9 GM/DL — SIGNIFICANT CHANGE UP (ref 32–36)
MCHC RBC-ENTMCNC: 32.9 GM/DL — SIGNIFICANT CHANGE UP (ref 32–36)
MCV RBC AUTO: 98.2 FL — SIGNIFICANT CHANGE UP (ref 80–100)
MCV RBC AUTO: 98.2 FL — SIGNIFICANT CHANGE UP (ref 80–100)
MONOCYTES # BLD AUTO: 0.74 K/UL — SIGNIFICANT CHANGE UP (ref 0–0.9)
MONOCYTES # BLD AUTO: 0.74 K/UL — SIGNIFICANT CHANGE UP (ref 0–0.9)
MONOCYTES NFR BLD AUTO: 6.9 % — SIGNIFICANT CHANGE UP (ref 2–14)
MONOCYTES NFR BLD AUTO: 6.9 % — SIGNIFICANT CHANGE UP (ref 2–14)
NEUTROPHILS # BLD AUTO: 7.61 K/UL — HIGH (ref 1.8–7.4)
NEUTROPHILS # BLD AUTO: 7.61 K/UL — HIGH (ref 1.8–7.4)
NEUTROPHILS NFR BLD AUTO: 71.2 % — SIGNIFICANT CHANGE UP (ref 43–77)
NEUTROPHILS NFR BLD AUTO: 71.2 % — SIGNIFICANT CHANGE UP (ref 43–77)
PLATELET # BLD AUTO: 288 K/UL — SIGNIFICANT CHANGE UP (ref 150–400)
PLATELET # BLD AUTO: 288 K/UL — SIGNIFICANT CHANGE UP (ref 150–400)
POTASSIUM SERPL-MCNC: 4.8 MMOL/L — SIGNIFICANT CHANGE UP (ref 3.5–5.3)
POTASSIUM SERPL-MCNC: 4.8 MMOL/L — SIGNIFICANT CHANGE UP (ref 3.5–5.3)
POTASSIUM SERPL-SCNC: 4.8 MMOL/L — SIGNIFICANT CHANGE UP (ref 3.5–5.3)
POTASSIUM SERPL-SCNC: 4.8 MMOL/L — SIGNIFICANT CHANGE UP (ref 3.5–5.3)
PROT SERPL-MCNC: 7.1 G/DL — SIGNIFICANT CHANGE UP (ref 6.6–8.7)
PROT SERPL-MCNC: 7.1 G/DL — SIGNIFICANT CHANGE UP (ref 6.6–8.7)
RBC # BLD: 3.84 M/UL — SIGNIFICANT CHANGE UP (ref 3.8–5.2)
RBC # BLD: 3.84 M/UL — SIGNIFICANT CHANGE UP (ref 3.8–5.2)
RBC # FLD: 13.2 % — SIGNIFICANT CHANGE UP (ref 10.3–14.5)
RBC # FLD: 13.2 % — SIGNIFICANT CHANGE UP (ref 10.3–14.5)
SODIUM SERPL-SCNC: 136 MMOL/L — SIGNIFICANT CHANGE UP (ref 135–145)
SODIUM SERPL-SCNC: 136 MMOL/L — SIGNIFICANT CHANGE UP (ref 135–145)
WBC # BLD: 10.68 K/UL — HIGH (ref 3.8–10.5)
WBC # BLD: 10.68 K/UL — HIGH (ref 3.8–10.5)
WBC # FLD AUTO: 10.68 K/UL — HIGH (ref 3.8–10.5)
WBC # FLD AUTO: 10.68 K/UL — HIGH (ref 3.8–10.5)

## 2023-11-02 PROCEDURE — 99284 EMERGENCY DEPT VISIT MOD MDM: CPT

## 2023-11-02 RX ORDER — METHOCARBAMOL 500 MG/1
1500 TABLET, FILM COATED ORAL ONCE
Refills: 0 | Status: COMPLETED | OUTPATIENT
Start: 2023-11-02 | End: 2023-11-02

## 2023-11-02 RX ORDER — KETOROLAC TROMETHAMINE 30 MG/ML
15 SYRINGE (ML) INJECTION ONCE
Refills: 0 | Status: DISCONTINUED | OUTPATIENT
Start: 2023-11-02 | End: 2023-11-02

## 2023-11-02 RX ORDER — LIDOCAINE 4 G/100G
1 CREAM TOPICAL ONCE
Refills: 0 | Status: COMPLETED | OUTPATIENT
Start: 2023-11-02 | End: 2023-11-02

## 2023-11-02 RX ADMIN — Medication 15 MILLIGRAM(S): at 23:45

## 2023-11-02 RX ADMIN — METHOCARBAMOL 1500 MILLIGRAM(S): 500 TABLET, FILM COATED ORAL at 17:31

## 2023-11-02 RX ADMIN — LIDOCAINE 1 PATCH: 4 CREAM TOPICAL at 17:32

## 2023-11-02 RX ADMIN — Medication 15 MILLIGRAM(S): at 17:31

## 2023-11-02 RX ADMIN — Medication 15 MILLIGRAM(S): at 19:03

## 2023-11-02 RX ADMIN — LIDOCAINE 1 PATCH: 4 CREAM TOPICAL at 19:04

## 2023-11-02 NOTE — ED ADULT NURSE REASSESSMENT NOTE - NS ED NURSE REASSESS COMMENT FT1
Report received from PEE Ma.  Patient awaiting MRI to r/o cauda equina.  Will continue to follow plan of care.
Pt brought over to [facility] for severe left leg pain since last Wednesday. Pain begins in lower back and radiates down her left leg. Pt states this is her 3rd time coming to the ED for the same issue. Pt endorses parathesia in both legs. +sensation in both lower extremities. Pt reports episodes of urinary incontinence starting last Wednesday however reports only lasting for a couple of days. Pt additionally states she has not been able to have a bowel movement since last Wednesday as well. Pt has abdominal discomfort due to constipation. Patient a&ox3, no acute distress, resp nonlabored. Denies headache, dizziness, chest pain, palpitations, SOB, n/v/d, urinary symptoms, fevers, chills at this time. Patient awaiting pain medication.

## 2023-11-02 NOTE — ED PROVIDER NOTE - PROGRESS NOTE DETAILS
Signed out pending MRI.  No cord compression or cauda equina does have some nerve root impingement neurologically intact recommend MiraLAX for bowel regimen and outpatient follow-up -Helder GOMEZ

## 2023-11-02 NOTE — ED PROVIDER NOTE - CLINICAL SUMMARY MEDICAL DECISION MAKING FREE TEXT BOX
42-year-old female patient presents to the ED complaining of lower back and leg pain with weakness.  Low suspicion for cauda equina versus conus medullaris syndrome however given patient's period of urinary retention and constipation with left lower extremity weakness, will order MRI of spine to evaluate for spinal cord involvement.  Will give Toradol, lidocaine patch, muscle relaxers for possible sciatica 42-year-old female patient presents to the ED complaining of lower back and leg pain with weakness.  Low suspicion for cauda equina versus conus medullaris syndrome however given patient's period of urinary retention and constipation with left lower extremity weakness, will order MRI of spine to evaluate for spinal cord involvement.  Will give Toradol, lidocaine patch, muscle relaxers for possible sciatica    MRI shows disc extrusion at L4-5 and L5-S1, likely compression of descending left L5 nerve root and possible contact of descending right S1 nerve root. No cauda equina compression. Multiple cysts in L ovary also noted, possible hemorragic cyst or endometrioma, will need outpatient pelvic ultrasound for further evaluation.     Discussed results and outcome of testing with the patient, given copy as well. The patient was provided an opportunity to ask questions and voice their concerns, all of which were addressed at length. Strict return precautions discussed with the patient. The patient verbalized understanding of the plan and agrees to follow through with it. The patient is safe for discharge at this time with close outpatient follow up.   Patient advised to please follow up with their primary care doctor within the next 24 hours and return to the Emergency Department for worsening symptoms or any other concerns.  Patient advised that their doctor may call [phone] to follow up on the specific results of the tests performed today in the emergency department.

## 2023-11-02 NOTE — ED PROVIDER NOTE - PATIENT PORTAL LINK FT
You can access the FollowMyHealth Patient Portal offered by [facility] by registering at the following website: [email or web address]. By joining [employer]’s FollowMyHealth portal, you will also be able to view your health information using other applications (apps) compatible with our system.

## 2023-11-02 NOTE — ED PROVIDER NOTE - OBJECTIVE STATEMENT
A 47-year-old female patient with a history of lumbar radiculopathy presents ED complaining of left leg and buttock pain.   patient was seen in the ED 1 week ago secondary to worsening left buttock and lower extremity pain,  head CT that showed degenerative changes but no other abnormalities on the lumbar spine, was discharged home, states that  since being discharged home she has had worsening pain, weakness of her lower extremity, has not had a bowel movement in 2 weeks, and also 3 days urinary retention.  Patient denies any pain or numbness in her right lower extremity, fever or chills, intravenous drug use, rashes.  Patient does follow with pain management,  states that her pain is normally controlled but has not been over the past 2 weeks.  No chest pain or shortness of breath.  No further complaints at this time

## 2023-11-02 NOTE — ED PROVIDER NOTE - NSFOLLOWUPINSTRUCTIONS_ED_ALL_ED_FT
1. Return to ED for worsening, progressive or any other concerning symptoms   2. Follow up with your primary care doctor in 2-3days  3. Follow up with your spine doctor/neurologist in regards to the MRI findings.   4. Follow up with OBGYN for pelvic ultrasound for further evaluation of cysts in your Left ovary.   5. Follow up with your pain management doctor for pain medications.     Chronic Pain    Chronic pain is a complex condition and the Emergency Department is not the ideal place to manage this condition. Prescription painkillers must be written by your primary care provider or a pain management physician. Avoid activities or triggers that exacerbate your pain.    SEEK IMMEDIATE MEDICAL CARE IF YOU HAVE ANY OF THE FOLLOWING SYMPTOMS: severe chest pain, fainting, vomiting blood, dark or bloody stools, or pain different from your chronic pain.

## 2023-11-02 NOTE — ED ADULT TRIAGE NOTE - CHIEF COMPLAINT QUOTE
pt seen at [facility] 2 times last week, diagnosed with sciatica, followed up with pain management yesterday who suggested she come back to ED. Last normal BM 1 week ago.

## 2023-11-02 NOTE — ED ADULT NURSE NOTE - OBJECTIVE STATEMENT
pt c/o lower back pain radiating to left leg. c/o sharp throbbing pain with numbness and tingling sensation. Pt stated, "I think I have a pinch nerve, this is my third ER visit, send by pcp for MRI. Recently dx with sciatica.  AO4, ambulatory.

## 2023-11-02 NOTE — ED ADULT NURSE NOTE - CHIEF COMPLAINT QUOTE
pt seen at [facility] 2 times last week, diagnosed with sciatica, followed up with pain management yesterday who suggested she come back to ED. Last normal BM 1 week ago.

## 2023-11-02 NOTE — ED STATDOCS - PROGRESS NOTE DETAILS
Patient briefly assessed in intake 1.  Patient with history of low back pain presenting with progressively worsening low back pain radiating to left buttock and down left leg for the past week.  She states that she has been trying Tylenol, oxycodone at home without any relief.  Yesterday, patient noticed that she was having episodes of urinary retention and has not had a bowel movement for the past week.  She went to her flank specialist, Dr. Helms,  yesterday who recommended that she come to the ED for MRI to evaluate for cauda equina syndrome. Patient to be moved to main ED for evaluation of another provider. Patient briefly assessed in intake 1.  Patient with history of low back pain presenting with progressively worsening low back pain radiating to left buttock and down left leg for the past week.  She states that she has been trying Tylenol, oxycodone at home without any relief.  Yesterday, patient noticed that she was having episodes of urinary retention and has not had a bowel movement for the past week.  She went to her pain management specialist, Dr. Akhtar,  yesterday who recommended that she come to the ED for MRI to evaluate for cauda equina syndrome. Patient to be moved to main ED for evaluation of another provider.

## 2023-11-02 NOTE — ED PROVIDER NOTE - PHYSICAL EXAMINATION
Const: Awake, alert and oriented to person, place, & time. In mild painful distress, unable to sit for long periods of time secondary to pain  HEENT: NC/AT. Moist mucous membranes.  Eyes: PERRLA. No scleral icterus. EOMI.  Neck:. Soft and supple. Full ROM without pain. No cervical midline tenderness.   Cardiac: Regular rate and regular rhythm. +S1/S2. Peripheral pulses 2+ and symmetric. No LE edema.  Resp: Speaking in full sentences, breath sounds equal and clear bilaterally. No wheezes, rales or rhonchi.  Abd: Soft, non-tender, non-distended. Normal bowel sounds in all 4 quadrants. No guarding or rebound.  MSK: Spine midline and non-tender. No CVAT.  no rashes or erythema noted to the back.  Skin: No rashes, abrasions or lacerations.  Neuro:   Cranial nerves II through XII intact, 5 out of 5 strength bilateral lower extremities, 5 out of 5 strength right lower extremity on flexion extension, 4-5 strength on left lower extremity on flexion extension.  DP and PT pulses intact.  2 out of 4 patellar and Achilles reflex bilaterally.  Patient able to ambulate on her own without assistance.

## 2023-11-02 NOTE — ED PROVIDER NOTE - NS ED ROS FT
Review of Systems:  	•	CONSTITUTIONAL - no fever or chills. No weight loss  	•	EYES - no eye pain, no blurred vision  	•	ENT - no sore throat, no rhinorrhea   	•	RESPIRATORY - no shortness of breath, no cough  	•	CARDIAC - no chest pain, no palpitations  	•	GI - + CONSTIPATION  	•	GENITO-URINARY - +URINARY RETENTION  	•	MUSCULOSKELETAL - + PAIN  	•	NEUROLOGIC - +WEAKNESS  	•	SKIN - No abrasions, no lacerations, no rashes  	•	PSYCH - no anxiety, non suicidal, non homicidal, no hallucination, no depression

## 2023-11-02 NOTE — ED PROVIDER NOTE - ATTENDING CONTRIBUTION TO CARE
Atraumatic low back pain without any signs, symptoms, or historical factors concerning for acute cord pathology, however referred to ED for MRI to r/o cauda equina. Multimodal analgesia. Signed out to oncoming provider pending .

## 2023-11-02 NOTE — ED ADULT NURSE NOTE - NSFALLUNIVINTERV_ED_ALL_ED
Bed/Stretcher in lowest position, wheels locked, appropriate side rails in place/Call bell, personal items and telephone in reach/Instruct patient to call for assistance before getting out of bed/chair/stretcher/Non-slip footwear applied when patient is off stretcher/[place] to call system/Physically safe environment - no spills, clutter or unnecessary equipment/Purposeful proactive rounding/Room/bathroom lighting operational, light cord in reach

## 2023-11-03 ENCOUNTER — NON-APPOINTMENT (OUTPATIENT)
Age: 48
End: 2023-11-03

## 2023-11-03 VITALS
RESPIRATION RATE: 20 BRPM | SYSTOLIC BLOOD PRESSURE: 123 MMHG | HEART RATE: 59 BPM | OXYGEN SATURATION: 97 % | DIASTOLIC BLOOD PRESSURE: 72 MMHG | TEMPERATURE: 99 F

## 2023-11-03 PROCEDURE — 86140 C-REACTIVE PROTEIN: CPT

## 2023-11-03 PROCEDURE — 72148 MRI LUMBAR SPINE W/O DYE: CPT | Mod: MA

## 2023-11-03 PROCEDURE — 72148 MRI LUMBAR SPINE W/O DYE: CPT | Mod: 26,MA

## 2023-11-03 PROCEDURE — 85652 RBC SED RATE AUTOMATED: CPT

## 2023-11-03 PROCEDURE — 85025 COMPLETE CBC W/AUTO DIFF WBC: CPT

## 2023-11-03 PROCEDURE — 36415 COLL VENOUS BLD VENIPUNCTURE: CPT

## 2023-11-03 PROCEDURE — 80053 COMPREHEN METABOLIC PANEL: CPT

## 2023-11-03 PROCEDURE — 99284 EMERGENCY DEPT VISIT MOD MDM: CPT | Mod: 25

## 2023-11-03 PROCEDURE — 96374 THER/PROPH/DIAG INJ IV PUSH: CPT

## 2023-11-03 PROCEDURE — 96376 TX/PRO/DX INJ SAME DRUG ADON: CPT

## 2023-11-03 RX ORDER — LIDOCAINE 4 G/100G
1 CREAM TOPICAL ONCE
Refills: 0 | Status: COMPLETED | OUTPATIENT
Start: 2023-11-03 | End: 2023-11-03

## 2023-11-03 RX ADMIN — LIDOCAINE 1 PATCH: 4 CREAM TOPICAL at 01:37

## 2023-11-08 ENCOUNTER — APPOINTMENT (OUTPATIENT)
Dept: PHYSICAL MEDICINE AND REHAB | Facility: CLINIC | Age: 48
End: 2023-11-08
Payer: MEDICARE

## 2023-11-08 VITALS
DIASTOLIC BLOOD PRESSURE: 78 MMHG | WEIGHT: 132 LBS | HEIGHT: 61 IN | BODY MASS INDEX: 24.92 KG/M2 | HEART RATE: 56 BPM | RESPIRATION RATE: 14 BRPM | SYSTOLIC BLOOD PRESSURE: 127 MMHG

## 2023-11-08 PROCEDURE — 99214 OFFICE O/P EST MOD 30 MIN: CPT

## 2023-11-08 RX ORDER — GABAPENTIN 300 MG/1
300 CAPSULE ORAL 3 TIMES DAILY
Qty: 90 | Refills: 1 | Status: ACTIVE | COMMUNITY
Start: 2023-11-03 | End: 1900-01-01

## 2023-11-08 RX ORDER — MELOXICAM 15 MG/1
15 TABLET ORAL
Qty: 30 | Refills: 1 | Status: ACTIVE | COMMUNITY
Start: 2023-11-03 | End: 1900-01-01

## 2023-11-27 ENCOUNTER — APPOINTMENT (OUTPATIENT)
Dept: OBGYN | Facility: CLINIC | Age: 48
End: 2023-11-27
Payer: MEDICARE

## 2023-11-27 VITALS
HEIGHT: 61 IN | BODY MASS INDEX: 26.24 KG/M2 | WEIGHT: 139 LBS | DIASTOLIC BLOOD PRESSURE: 72 MMHG | SYSTOLIC BLOOD PRESSURE: 110 MMHG

## 2023-11-27 DIAGNOSIS — F17.210 NICOTINE DEPENDENCE, CIGARETTES, UNCOMPLICATED: ICD-10-CM

## 2023-11-27 DIAGNOSIS — Z12.39 ENCOUNTER FOR OTHER SCREENING FOR MALIGNANT NEOPLASM OF BREAST: ICD-10-CM

## 2023-11-27 DIAGNOSIS — Z12.4 ENCOUNTER FOR SCREENING FOR MALIGNANT NEOPLASM OF CERVIX: ICD-10-CM

## 2023-11-27 DIAGNOSIS — F17.200 NICOTINE DEPENDENCE, UNSPECIFIED, UNCOMPLICATED: ICD-10-CM

## 2023-11-27 DIAGNOSIS — R10.2 PELVIC AND PERINEAL PAIN: ICD-10-CM

## 2023-11-27 PROCEDURE — 99386 PREV VISIT NEW AGE 40-64: CPT

## 2023-11-27 PROCEDURE — 99212 OFFICE O/P EST SF 10 MIN: CPT | Mod: 25

## 2023-11-28 LAB — HPV HIGH+LOW RISK DNA PNL CVX: NOT DETECTED

## 2023-11-29 ENCOUNTER — APPOINTMENT (OUTPATIENT)
Dept: ULTRASOUND IMAGING | Facility: CLINIC | Age: 48
End: 2023-11-29
Payer: COMMERCIAL

## 2023-11-29 ENCOUNTER — OUTPATIENT (OUTPATIENT)
Dept: OUTPATIENT SERVICES | Facility: HOSPITAL | Age: 48
LOS: 1 days | End: 2023-11-29
Payer: COMMERCIAL

## 2023-11-29 ENCOUNTER — RESULT REVIEW (OUTPATIENT)
Age: 48
End: 2023-11-29

## 2023-11-29 ENCOUNTER — APPOINTMENT (OUTPATIENT)
Dept: ULTRASOUND IMAGING | Facility: CLINIC | Age: 48
End: 2023-11-29

## 2023-11-29 ENCOUNTER — APPOINTMENT (OUTPATIENT)
Dept: MAMMOGRAPHY | Facility: CLINIC | Age: 48
End: 2023-11-29

## 2023-11-29 DIAGNOSIS — Z12.39 ENCOUNTER FOR OTHER SCREENING FOR MALIGNANT NEOPLASM OF BREAST: ICD-10-CM

## 2023-11-29 DIAGNOSIS — N83.209 UNSPECIFIED OVARIAN CYST, UNSPECIFIED SIDE: ICD-10-CM

## 2023-11-29 PROCEDURE — 76830 TRANSVAGINAL US NON-OB: CPT

## 2023-11-29 PROCEDURE — 76830 TRANSVAGINAL US NON-OB: CPT | Mod: 26

## 2023-11-29 PROCEDURE — 77067 SCR MAMMO BI INCL CAD: CPT | Mod: 26

## 2023-11-29 PROCEDURE — 77063 BREAST TOMOSYNTHESIS BI: CPT

## 2023-11-29 PROCEDURE — 77067 SCR MAMMO BI INCL CAD: CPT

## 2023-11-29 PROCEDURE — 77063 BREAST TOMOSYNTHESIS BI: CPT | Mod: 26

## 2023-11-30 LAB — CYTOLOGY CVX/VAG DOC THIN PREP: NORMAL

## 2023-12-06 ENCOUNTER — APPOINTMENT (OUTPATIENT)
Dept: PHYSICAL MEDICINE AND REHAB | Facility: CLINIC | Age: 48
End: 2023-12-06
Payer: MEDICARE

## 2023-12-06 VITALS
DIASTOLIC BLOOD PRESSURE: 81 MMHG | RESPIRATION RATE: 14 BRPM | HEART RATE: 66 BPM | HEIGHT: 61 IN | SYSTOLIC BLOOD PRESSURE: 146 MMHG | WEIGHT: 139 LBS | BODY MASS INDEX: 26.24 KG/M2

## 2023-12-06 PROCEDURE — 99214 OFFICE O/P EST MOD 30 MIN: CPT

## 2023-12-28 ENCOUNTER — APPOINTMENT (OUTPATIENT)
Dept: ORTHOPEDIC SURGERY | Facility: CLINIC | Age: 48
End: 2023-12-28
Payer: MEDICARE

## 2023-12-28 ENCOUNTER — APPOINTMENT (OUTPATIENT)
Dept: OBGYN | Facility: CLINIC | Age: 48
End: 2023-12-28
Payer: COMMERCIAL

## 2023-12-28 VITALS
SYSTOLIC BLOOD PRESSURE: 110 MMHG | BODY MASS INDEX: 20.78 KG/M2 | DIASTOLIC BLOOD PRESSURE: 70 MMHG | HEIGHT: 67 IN | WEIGHT: 132.4 LBS

## 2023-12-28 VITALS
DIASTOLIC BLOOD PRESSURE: 73 MMHG | SYSTOLIC BLOOD PRESSURE: 115 MMHG | HEIGHT: 61 IN | WEIGHT: 139 LBS | BODY MASS INDEX: 26.24 KG/M2 | HEART RATE: 73 BPM

## 2023-12-28 DIAGNOSIS — N83.209 UNSPECIFIED OVARIAN CYST, UNSPECIFIED SIDE: ICD-10-CM

## 2023-12-28 PROCEDURE — 99213 OFFICE O/P EST LOW 20 MIN: CPT

## 2023-12-28 PROCEDURE — 99204 OFFICE O/P NEW MOD 45 MIN: CPT

## 2023-12-28 NOTE — REASON FOR VISIT
[Initial Visit] : an initial visit for [Back Pain] : back pain [Sciatica] : sciatica [Family Member] : family member

## 2023-12-28 NOTE — DISCUSSION/SUMMARY
[FreeTextEntry1] :  47 y.o  (LMP 2023) presenting for follow up regarding ovarian cysts  Plan: - TVUS (2023): Uterus: 8.9 cm x 5.6 cm x 6.4 cm. Within normal limits. Endometrium: 20 mm. Within normal limits. Right ovary: 3.0 cm x 1.2 cm x 2.7 cm. Within normal limits. Left ovary: 3.7 cm x 2.9 cm x 3.0 cm. multiple follicles - No acute/concerning findings. No longer reporting pelvic pain at this time. Likely discomfort related to ovulation. Counseled regarding use of OTC pain medications around menses. - No further interventions at this time - RTO 1 yr for annual

## 2023-12-28 NOTE — HISTORY OF PRESENT ILLNESS
[FreeTextEntry1] :  47 y.o  (LMP 2023) presenting for follow up regarding ovarian cysts  Patient reports improved pelvic pain and discomfort since last visit.  Following with PM&R/ortho for lower back/leg pain- plan for epidural procedure. States that she is able to walk more freely and hold her grandchild now.

## 2023-12-28 NOTE — PHYSICAL EXAM
[de-identified] : CONSTITUTIONAL: Patient is a very pleasant individual who is well-nourished and appears stated age. PSYCHIATRIC: Alert and oriented times three and in no apparent distress, and participates with orthopedic evaluation well. HEAD: Atraumatic and nonsyndromic in appearance. EENT: No thyromegaly, EOMI. RESPIRATORY: Respiratory rate is regular, not dyspneic on examination. LYMPHATICS: There is no cervical or axillary lymphadenopathy. INTEGUMENTARY: Skin is clean, dry, and intact to bilateral lower extremities. VASCULAR: There is brisk capillary refill about the bilateral Lower Extremities with 2+ DP Pulse  Palpation: There is minor tenderness palpation left-sided lower lumbar spine lumbosacral region no significant paraspinal muscle hypertonicity No tenderness in bilateral PSIS No pain with internal/external range of motion of bilateral hips Muscle Strength Testing: Hip Flexion: 5/5 B/L Knee Extension: 5/5 B/L Knee Flexion: 5/5 B/L Dorsiflexion: 5/5 RLE, 4/5 RLE  EHL: 5/5 RLE, 3/5 LLE Plantarflexion: 5/5 B/L  Sensation: SILT L2-S1 B/L except: Altered sensation to dorsum of left foot and L5 distribution  Reflexes: 2+ Quadriceps/Achilles  Gait: Normal gait w/o assistance Able to perform tandem gait Able to Heel Walk Able to Toe Walk  Special Testing:  Positive straight leg raise test to left lower extremity Negative clonus BLLE  [de-identified] : MRI lumbar spine performed in November 2023 at [facility] demonstrates disc space degeneration L4-5 and L5-S1.  There is a left-sided paracentral disc herniation with extrusion on the left at L4-5 with compression of the descending L5 nerve root.  There is an L5-S1 herniation with annular tear paracentral no significant lateral recess or compression of the descending or exiting nerve at this level.

## 2023-12-28 NOTE — HISTORY OF PRESENT ILLNESS
[de-identified] : Chief Complaint: Low back and left leg pain   History of Present Illness: 48-year-old female presenting today for initial evaluation for severe low back leg pain.  She states that in October she had an episode while lifting an object and suffered from severe low back and left leg pain which she has dealt with for years on and off.  She was seen in the emergency room at [facility] given medications and then she had follow-up with Dr. Brett Akhtar PM&R.  She was diagnosed with a left L5 radiculopathy due to an L4-5 herniated disc and extruded disc as well as an L5-S1 herniated disc with no significant S1 compression.  Her pain was severe in the low back and left lower extremity she was also having significant weakness with dorsiflexion and EHL as well as numbness and tingling in the L5 distribution.  She was treated conservatively with medications time physical therapy and today she states that her back pain has improved as well as her lower extremity pain.  Her strength is improved but she still has some numbness and tingling but no severe pain in the left lower extremity.  She was on gabapentin which she is unable to take due to feelings of headache and grogginess, she was unable to take meloxicam or other NSAIDs due to gastritis and ulcers.  She was going to physical therapy and states that that did help her symptoms.  She is currently on a muscle relaxer that is helping her sleep at nighttime.  So far symptoms are still present still debilitating but she feels that she is improving.  She has an appointment with Dr. Akhtar next week for a follow-up visit would like to discuss possible epidural steroid injection.   Past medical history, past surgical history, medications, allergies, social history, and family history are as documented in our records today.  Notable items include: None   Review of Systems: I have reviewed the patient's documented Review of Systems data today, I concur with this documentation.

## 2023-12-28 NOTE — COUNSELING
[Nutrition/ Exercise/ Weight Management] : nutrition, exercise, weight management [Body Image] : body image [Vitamins/Supplements] : vitamins/supplements [Drugs/Alcohol] : drugs, alcohol [Breast Self Exam] : breast self exam [Bladder Hygiene] : bladder hygiene [Confidentiality] : confidentiality [Lab Results] : lab results

## 2023-12-28 NOTE — ASSESSMENT
[FreeTextEntry1] : 48-year-old female with left L5 radiculopathy  Today Grace and I reviewed her physical exam findings, signs and symptoms as well as her MRI today in the office.  She does have a extruded fragment with compression of the left L5 nerve and this coincides with her current complaints and left lower extremity radicular symptoms.  She states that her pain as well as her strength has returned and her lower extremity provide she still has some weakness with dorsiflexion and EHL and some paresthesias in that distribution of the L5 nerve.  However they are improving and she has no signs of foot drop or altered gait because of it. She still having significant dysfunction in her day-to-day activities and for her recreational activities.  She does state that she has improved over the past couple weeks and would like to continue nonoperative intervention at this time. I discussed with her that I would agree with this course and I also discussed that the numbness tingling and weakness could remain department if there is no intervention and we do not remove the pressure on the nerve.  I also discussed that this will likely improve over time but the degree and how fast that occurs I cannot predict.  I discussed that if her symptoms do not significant improve or if they are still bothersome for her regular every day and recreational activities if she still bothered by lower extremity numbness pain weakness and she be indicated for a laminectomy and discectomy and L5 nerve decompression.  She will follow-up with Dr. Akhtar for possible epidural steroid injections. she will follow-up with me in 4 to 6 weeks and we will discuss her symptoms at that time and readdress further intervention.

## 2024-01-03 ENCOUNTER — APPOINTMENT (OUTPATIENT)
Dept: PHYSICAL MEDICINE AND REHAB | Facility: CLINIC | Age: 49
End: 2024-01-03
Payer: COMMERCIAL

## 2024-01-03 VITALS
SYSTOLIC BLOOD PRESSURE: 120 MMHG | WEIGHT: 132 LBS | HEIGHT: 61 IN | RESPIRATION RATE: 14 BRPM | DIASTOLIC BLOOD PRESSURE: 77 MMHG | BODY MASS INDEX: 24.92 KG/M2 | HEART RATE: 59 BPM

## 2024-01-03 PROCEDURE — 99214 OFFICE O/P EST MOD 30 MIN: CPT

## 2024-01-03 NOTE — HISTORY OF PRESENT ILLNESS
[FreeTextEntry1] : Ms. CARLEE LOBATO is a 48 year old  female who presents for follow up. At last visit, she was continued on Gabapentin, held on PT, given Robaxin, discussed an GREGORIO and referred to Ortho Spine. Ortho Spine did not recommend surgical intervention at this time. Overall she is doing better . She is no longer taking Gabapentin. She is taking Robaxin BID with good relief. She is pending to restart PT.   Location:Left lower back Onset:Chronic but worsened in end of 9/2023. Denies any trauma or inciting events. Went to ER twice. Provocation/Palliative: Worse with sitting and laying flat, better with standing Quality:Shooting Radiation:Down LLE in L5 distribution Severity:Now 3/10 Timing: Improved since last visit  No bowel/bladder changes. No groin numbness.

## 2024-01-03 NOTE — ASSESSMENT
[FreeTextEntry1] : Ms. CARLEE LOBATO is a 48 year old female who presented with severe L sided low back pain with pain down her LLE, consistent with an acute lumbar radiculopathy. Patient went to ER to be evaluated for BARBIE, which was negative, but MRI did reveal significant disc extrusions. Pain was doing better but had recent exacerbation due to HEP, although now improved. Denies any red flag signs at this time. Will recommend: - Restart PT 2-3x/week for stretching, strengthening (especially of core muscles), ROM exercises, HEP and modalities PRN including myofascial release, moist heat  - Methocarbamol 750mg BID PRN. Advised her to try to wean down on it if possible. Advised of side effects including sedation. - Discussed with patient the risks (including but not limited to bleeding, infection, nerve damage, etc), benefits and alternatives to an epidural steroid injection for which patient understands. She would like to hold off for now  RTC in 4-6 weeks or earlier if needed. Patient aware of red flag signs including any changes to their bowel/bladder control, groin numbness or new weakness. Patient knows to seek immediate attention by calling 911 or going to nearest ER if these symptoms appear.

## 2024-01-03 NOTE — PHYSICAL EXAM
[FreeTextEntry1] : PE: Constitutional: Appears comfortable, calm and cooperative MSK (Back)  Inspection: no gross swelling identified  Palpation: Mild tenderness of the left lower lumbar paraspinals  ROM: Limited lumbar flexion to 80 degrees due to pain, able to extend 15 degrees without significant additional pain  Strength: 5/5 strength in bilateral LE  Reflexes: 2+ Patella reflex bilaterally, 2+ Achilles reflex bilaterally, negative clonus bilaterally  Sensation: Intact to light touch in bilateral lower extremities except for slightly decreased sensation to light touch over dorsum of L big toe Special tests: SLR: positive on left, negative on right.

## 2024-02-14 ENCOUNTER — APPOINTMENT (OUTPATIENT)
Dept: PHYSICAL MEDICINE AND REHAB | Facility: CLINIC | Age: 49
End: 2024-02-14
Payer: COMMERCIAL

## 2024-02-14 VITALS
WEIGHT: 139 LBS | HEART RATE: 65 BPM | RESPIRATION RATE: 14 BRPM | DIASTOLIC BLOOD PRESSURE: 81 MMHG | HEIGHT: 61 IN | BODY MASS INDEX: 26.24 KG/M2 | SYSTOLIC BLOOD PRESSURE: 126 MMHG

## 2024-02-14 PROCEDURE — 99213 OFFICE O/P EST LOW 20 MIN: CPT

## 2024-02-14 NOTE — HISTORY OF PRESENT ILLNESS
[FreeTextEntry1] : Ms. CARLEE LOBATO is a 48 year old female who presents for follow up. At last visit, she was restarted on PT, Robaxin and discussed an GREGORIO. She is overall doing much better. Still taking Robaxin at night. She is doing well with PT. Denies any new symptoms. Denies any new exacerbations.   Location:Left lower back Onset:Chronic but worsened in end of 9/2023. Denies any trauma or inciting events. Went to ER twice. Provocation/Palliative: Worse with sitting and laying flat, better with standing Quality:Shooting Radiation:Down LLE in L5 distribution but improved Severity:Now 1-2/10 Timing: Improved since last visit  No bowel/bladder changes. No groin numbness.

## 2024-02-14 NOTE — PHYSICAL EXAM
[FreeTextEntry1] : PE: Constitutional: Appears comfortable, calm and cooperative MSK (Back)  Inspection: no gross swelling identified  Palpation: Mild tenderness of the left lower lumbar paraspinals  ROM: Able to flex 90 degrees, able to extend 15 degrees without significant additional pain  Strength: 5/5 strength in bilateral LE  Reflexes: 2+ Patella reflex bilaterally, 2+ Achilles reflex bilaterally, negative clonus bilaterally  Sensation: Intact to light touch in bilateral lower extremities except for slightly decreased sensation to light touch over dorsum of L big toe Special tests: SLR: equivocal on left, negative on right.

## 2024-02-14 NOTE — ASSESSMENT
[FreeTextEntry1] : Ms. CARLEE LOBATO is a 48 year old female who presented with severe L sided low back pain with pain down her LLE, consistent with an acute lumbar radiculopathy. Patient went to ER to be evaluated for BARBIE, which was negative, but MRI did reveal significant disc extrusions. Pain has been much better with PT. Denies any red flag signs at this time. Will recommend: - Continue PT 2-3x/week for stretching, strengthening (especially of core muscles), ROM exercises, HEP and modalities PRN including myofascial release, moist heat - Methocarbamol 750mg Qhs PRN with plan to taper off. Advised her to try to wean down on it if possible. Advised of side effects including sedation.  RTC in 6 weeks or earlier if needed. Patient aware of red flag signs including any changes to their bowel/bladder control, groin numbness or new weakness. Patient knows to seek immediate attention by calling 911 or going to nearest ER if these symptoms appear.

## 2024-03-27 ENCOUNTER — APPOINTMENT (OUTPATIENT)
Dept: PHYSICAL MEDICINE AND REHAB | Facility: CLINIC | Age: 49
End: 2024-03-27
Payer: COMMERCIAL

## 2024-03-27 VITALS
BODY MASS INDEX: 26.24 KG/M2 | HEART RATE: 68 BPM | HEIGHT: 61 IN | DIASTOLIC BLOOD PRESSURE: 69 MMHG | RESPIRATION RATE: 14 BRPM | WEIGHT: 139 LBS | SYSTOLIC BLOOD PRESSURE: 106 MMHG

## 2024-03-27 PROCEDURE — 99213 OFFICE O/P EST LOW 20 MIN: CPT

## 2024-03-27 NOTE — ASSESSMENT
[FreeTextEntry1] : Ms. CARLEE LOBATO is a 48 year old female who presented with severe L sided low back pain with pain down her LLE, consistent with a lumbar radiculopathy. Patient went to ER to be evaluated for BARBIE, which was negative, but MRI did reveal significant disc extrusions. Pain has been much better with PT and HEP. Denies any red flag signs at this time. Will recommend: - Continue PT 2-3x/week for stretching, strengthening (especially of core muscles), ROM exercises, HEP and modalities PRN including myofascial release, moist heat -Continue occasional Methocarbamol 750mg Qhs PRN with plan to taper off. Advised her to try to wean down on it if possible. Advised of side effects including sedation.  RTC as needed. Patient aware of red flag signs including any changes to their bowel/bladder control, groin numbness or new weakness. Patient knows to seek immediate attention by calling 911 or going to nearest ER if these symptoms appear.

## 2024-03-27 NOTE — HISTORY OF PRESENT ILLNESS
[FreeTextEntry1] : Ms. CARLEE LOBATO is a 48 year old female who presents for follow up. At last visit, she was continued on PT and Robaxin. She is overall feeling much better. She is only rarely taking the Robaxin. Deneis any new symptoms. She is still doing PT but is doing a HEP for 2 hours/day.    Location:Left lower back Onset:Chronic but worsened in end of 9/2023. Denies any trauma or inciting events. Went to ER twice. Provocation/Palliative: Worse with sitting and laying flat, better with standing Quality:Shooting Radiation:Minimall down LLE now with exercise Severity:Now 1/10 Timing: Improved since last visit   No bowel/bladder changes. No groin numbness.

## 2024-04-17 ENCOUNTER — OFFICE (OUTPATIENT)
Dept: URBAN - METROPOLITAN AREA CLINIC 94 | Facility: CLINIC | Age: 49
Setting detail: OPHTHALMOLOGY
End: 2024-04-17
Payer: COMMERCIAL

## 2024-04-17 DIAGNOSIS — H16.223: ICD-10-CM

## 2024-04-17 DIAGNOSIS — H52.4: ICD-10-CM

## 2024-04-17 DIAGNOSIS — H16.222: ICD-10-CM

## 2024-04-17 DIAGNOSIS — H16.221: ICD-10-CM

## 2024-04-17 DIAGNOSIS — H52.7: ICD-10-CM

## 2024-04-17 PROCEDURE — 83861 MICROFLUID ANALY TEARS: CPT | Mod: RT | Performed by: OPHTHALMOLOGY

## 2024-04-17 PROCEDURE — 92015 DETERMINE REFRACTIVE STATE: CPT | Performed by: OPHTHALMOLOGY

## 2024-04-17 PROCEDURE — 83861 MICROFLUID ANALY TEARS: CPT | Mod: LT | Performed by: OPHTHALMOLOGY

## 2024-04-17 PROCEDURE — 92004 COMPRE OPH EXAM NEW PT 1/>: CPT | Performed by: OPHTHALMOLOGY

## 2024-04-23 NOTE — ED ADULT NURSE NOTE - NS ED NURSE DISCH DISPOSITION
EMERGENCY DEPARTMENT ENCOUNTER    Pt Name: Lela Tate  MRN: [id number]  Birthdate 2009  Date of evaluation: 4/23/24  CHIEF COMPLAINT       Chief Complaint   Patient presents with    Pharyngitis     Onset 3 days     HISTORY OF PRESENT ILLNESS   Presenting with chief complaint of a sore throat for the last 3 days.  She denies cough, rhinorrhea, nasal congestion.  Denies myalgias.  Denies fever or chills.  She describes a sharp pain anytime she swallows or tries to speak.    The history is provided by the patient.           REVIEW OF SYSTEMS     Review of Systems   Constitutional:  Negative for chills and fever.   HENT:  Positive for sore throat and trouble swallowing. Negative for congestion, rhinorrhea and voice change.    Respiratory:  Negative for cough and shortness of breath.    Cardiovascular:  Negative for chest pain.   Gastrointestinal:  Negative for nausea and vomiting.   Musculoskeletal:  Negative for myalgias.   Neurological:  Negative for dizziness, light-headedness and headaches.     PASTMEDICAL HISTORY   History reviewed. No pertinent past medical history.  Past Problem List  There is no problem list on file for this patient.    SURGICAL HISTORY     History reviewed. No pertinent surgical history.  CURRENT MEDICATIONS       Previous Medications    IBUPROFEN (ADVIL;MOTRIN) 100 MG/5ML SUSPENSION    Take 11.6 mLs by mouth every 6 hours as needed for Pain or Fever.    OFLOXACIN (OCUFLOX) 0.3 % SOLUTION    Place 2 drops into the left eye 4 times daily for 10 days    ONDANSETRON (ZOFRAN-ODT) 4 MG DISINTEGRATING TABLET    Take 1 tablet by mouth 3 times daily as needed for Nausea or Vomiting     ALLERGIES     has No Known Allergies.  FAMILY HISTORY     has no family status information on file.      SOCIAL HISTORY       Social History     Tobacco Use    Smoking status: Never    Smokeless tobacco: Never   Vaping Use    Vaping Use: Never used   Substance Use Topics    Alcohol use: No    Drug use: No  Discharged

## 2024-05-08 ENCOUNTER — APPOINTMENT (OUTPATIENT)
Dept: PHYSICAL MEDICINE AND REHAB | Facility: CLINIC | Age: 49
End: 2024-05-08
Payer: COMMERCIAL

## 2024-05-08 VITALS
HEART RATE: 64 BPM | RESPIRATION RATE: 15 BRPM | WEIGHT: 136 LBS | DIASTOLIC BLOOD PRESSURE: 75 MMHG | SYSTOLIC BLOOD PRESSURE: 116 MMHG | HEIGHT: 61 IN | BODY MASS INDEX: 25.68 KG/M2

## 2024-05-08 PROCEDURE — 99214 OFFICE O/P EST MOD 30 MIN: CPT

## 2024-05-08 PROCEDURE — G2211 COMPLEX E/M VISIT ADD ON: CPT

## 2024-05-08 RX ORDER — PREDNISONE 10 MG/1
10 TABLET ORAL
Qty: 20 | Refills: 0 | Status: ACTIVE | COMMUNITY
Start: 2024-05-08 | End: 1900-01-01

## 2024-05-08 NOTE — PHYSICAL EXAM
[FreeTextEntry1] : PE: Constitutional: Appears comfortable, calm and cooperative MSK (Back)  Inspection: no gross swelling identified  Palpation: Mild tenderness of the left lower lumbar paraspinals  ROM: Able to flex 90 degrees, able to extend 15 degrees without significant additional pain  Strength: 5/5 strength in bilateral LE, able to heel and toe walk  Reflexes: 2+ Patella reflex bilaterally, 2+ Achilles reflex bilaterally, negative clonus bilaterally  Sensation: Intact to light touch in bilateral lower extremities except for slightly decreased sensation to light touch over dorsum of L big toe Special tests: SLR: positive on left, negative on right

## 2024-05-08 NOTE — ASSESSMENT
[FreeTextEntry1] : Ms. CARLEE LOBATO is a 48 year old female who presented with severe L sided low back pain with pain down her LLE, consistent with a lumbar radiculopathy. Patient went to ER to be evaluated for BARBIE, which was negative, but MRI did reveal significant disc extrusions. Pain had been much better with PT and HEP but now is suffering from an acute exacerbation with R sided pain now as well. . Denies any red flag signs at this time. Will recommend: - Will obtain a new MRI L Spine given worsening pain and now RLE radicular pain - Restart PT 2-3x/week for stretching, strengthening (especially of core muscles), ROM exercises, HEP and modalities PRN including myofascial release, moist heat - Restart Gabapentin 300mg Qhs with gradual increase to 300mg TID. Patient aware of side effects and risks including sedation, leg swelling, and possible mood changes.  - Prednisone Rx given, to take as recommended. Patient advised on potential side effects including but not limited to increased risk of elevated blood sugar, blood pressure, and infection. Patient encouraged to take medication with food and not with NSAIDs.  -Continue occasional Methocarbamol 750mg Qhs PRN. Advised of side effects including sedation.  RTC 2 weeks. Patient aware of red flag signs including any changes to their bowel/bladder control, groin numbness or new weakness. Patient knows to seek immediate attention by calling 911 or going to nearest ER if these symptoms appear.  This patient is being managed for a complex chronic pain that requires ongoing medical management. The nature of this condition requires a longitudinal relationship and monitoring over time for appropriate treatment.

## 2024-05-16 ENCOUNTER — OUTPATIENT (OUTPATIENT)
Dept: OUTPATIENT SERVICES | Facility: HOSPITAL | Age: 49
LOS: 1 days | End: 2024-05-16

## 2024-05-16 ENCOUNTER — APPOINTMENT (OUTPATIENT)
Dept: MRI IMAGING | Facility: CLINIC | Age: 49
End: 2024-05-16
Payer: COMMERCIAL

## 2024-05-16 DIAGNOSIS — M47.816 SPONDYLOSIS WITHOUT MYELOPATHY OR RADICULOPATHY, LUMBAR REGION: ICD-10-CM

## 2024-05-16 DIAGNOSIS — Z00.8 ENCOUNTER FOR OTHER GENERAL EXAMINATION: ICD-10-CM

## 2024-05-16 PROCEDURE — 72148 MRI LUMBAR SPINE W/O DYE: CPT | Mod: 26

## 2024-05-29 ENCOUNTER — APPOINTMENT (OUTPATIENT)
Dept: PHYSICAL MEDICINE AND REHAB | Facility: CLINIC | Age: 49
End: 2024-05-29
Payer: COMMERCIAL

## 2024-05-29 VITALS
BODY MASS INDEX: 25.68 KG/M2 | HEART RATE: 73 BPM | SYSTOLIC BLOOD PRESSURE: 130 MMHG | HEIGHT: 61 IN | WEIGHT: 136 LBS | RESPIRATION RATE: 14 BRPM | DIASTOLIC BLOOD PRESSURE: 73 MMHG

## 2024-05-29 PROCEDURE — 99214 OFFICE O/P EST MOD 30 MIN: CPT

## 2024-05-29 PROCEDURE — G2211 COMPLEX E/M VISIT ADD ON: CPT

## 2024-05-29 NOTE — HISTORY OF PRESENT ILLNESS
[FreeTextEntry1] : Ms. CARLEE LOBATO is a 48 year old female who presents for follow up. At last visit, she was ordered a new MRI L Spine, restarted on PT, Gabapentin up to 300mg TID, given prednisone taper and continued on Robaxin PRN. She did get significant relief from Prednisone taper but pain has slowly returned. She has gotten some relief from the Gabapentin/robaxin. She does say the majority of pain is now in low back, not so much down legs. She has not yet restarted PT.    Location:Left lower back Onset:Chronic but worsened in end of 9/2023. Denies any trauma or inciting events. Went to ER twice. Provocation/Palliative: Worse with sitting and laying flat, better with standing Quality:Shooting Radiation:Minimally down LLE>RLE. Severity: Moderate-severe Timing: Worsening since last visit  No bowel/bladder changes. No groin numbness.

## 2024-05-29 NOTE — PHYSICAL EXAM
[FreeTextEntry1] : PE: Constitutional: Appears comfortable, calm and cooperative MSK (Back)  Inspection: no gross swelling identified  Palpation: Mild tenderness of the left lower lumbar paraspinals  ROM: Able to flex 60 degrees, able to extend 15 degrees without significant additional pain  Strength: 5/5 strength in bilateral LE except for L ankle dorsiflexion and L EHL which is 4/5  Reflexes: 2+ Patella reflex bilaterally, 2+ Achilles reflex bilaterally, negative clonus bilaterally  Sensation: Intact to light touch in bilateral lower extremities except for slightly decreased sensation to light touch over dorsum of L big toe Special tests: SLR: positive on left, negative on right.

## 2024-05-29 NOTE — DATA REVIEWED
[FreeTextEntry1] :   XAM: [id number] - MR SPINE LUMBAR  - ORDERED BY: KEYSHAWN ALEJO   PROCEDURE DATE:  05/16/2024    INTERPRETATION:  CLINICAL INFORMATION: Low back pain and worsening lumbar radiculopathy  ADDITIONAL CLINICAL INFORMATION: Scoliosis M41.9  TECHNIQUE: Multiplanar, multisequence MRI was performed of the lumbar spine. IV Contrast: NONE  PRIOR STUDIES: 11/3/2023  FINDINGS:  LOCALIZER: No additional findings. BONES: Endplate marrow edema at L3-L4, L4-L5 and L5-S1. ALIGNMENT: Mild scoliosis of the lower thoracic spine and convex to the right SACROILIAC JOINTS/SACRUM: There is no sacral fracture. The SI joints are partially visualized but are intact. CONUS AND CAUDA EQUINA: The distal cord and conus are normal in signal. Conus terminates at L1. VISUALIZED INTRAPELVIC/INTRA-ABDOMINAL SOFT TISSUES: Normal. PARASPINAL SOFT TISSUES: Normal.   INDIVIDUAL LEVELS:  LOWER THORACIC SPINE: No spinal canal or neuroforaminal stenosis.  L1-L2: No spinal canal or neuroforaminal stenosis. L2-L3: No spinal canal or neuroforaminal stenosis. L3-L4: Mild disc bulging with minimal posterior annular fissuring. No spinal canal or foraminal narrowing. L4-L5: Disc degeneration with a moderate sized broad-based posterior disc protrusion that causes moderate to severe lateral recess stenosis. The degree of stenosis in the lateral recesses as slightly decreased compared to the prior study. No foraminal narrowing. Mild facet arthrosis. Calcification is noted within the disc protrusion L5-S1: Mild disc bulging with a superimposed moderate-sized central disc protrusion that causes mild lateral recess stenosis without significant spinal canal stenosis. Mild foraminal narrowing. Calcification is noted within the disc protrusion   IMPRESSION:  Lower lumbar spondylosis with disc herniations at L4-L5 and L5-S1 with a similar appearance compared to the prior study. There is slightly less lateral recess stenosis at L4-L5  --- End of Report ---       POORNIMA LECHUGA MD; Attending Radiologist This document has been electronically signed. May 21 2024 11:28PM

## 2024-05-29 NOTE — ASSESSMENT
[FreeTextEntry1] : Ms. CARLEE LOBATO is a 48 year old female who presented with severe L sided low back pain with pain down her LLE, consistent with a lumbar radiculopathy. Patient went to ER to be evaluated for BARBIE, which was negative, but MRI did reveal significant disc extrusions. Pain had been much better with PT and HEP but now is suffering from an acute exacerbation with R sided pain now as well.. Denies any red flag signs at this time. Will recommend: - Restart PT 2-3x/week for stretching, strengthening (especially of core muscles), ROM exercises, HEP and modalities PRN including myofascial release, moist heat - Switch Gabapentin form 300mg TID to 300/600 given sedation during the day. Patient aware of side effects and risks including sedation, leg swelling, and possible mood changes. - Advised her to f/u with Dr. Cline given weakness and significant pain for which she agreed.  -Continue occasional Methocarbamol 750mg Qhs PRN. Advised of side effects including sedation.  RTC after surgical follow up. Patient aware of red flag signs including any changes to their bowel/bladder control, groin numbness or new weakness. Patient knows to seek immediate attention by calling 911 or going to nearest ER if these symptoms appear.  This patient is being managed for a complex chronic pain that requires ongoing medical management. The nature of this condition requires a longitudinal relationship and monitoring over time for appropriate treatment.

## 2024-06-04 ENCOUNTER — EMERGENCY (EMERGENCY)
Facility: HOSPITAL | Age: 49
LOS: 1 days | Discharge: HOME | End: 2024-06-04
Attending: STUDENT IN AN ORGANIZED HEALTH CARE EDUCATION/TRAINING PROGRAM
Payer: MEDICAID

## 2024-06-04 VITALS
DIASTOLIC BLOOD PRESSURE: 76 MMHG | RESPIRATION RATE: 18 BRPM | SYSTOLIC BLOOD PRESSURE: 137 MMHG | OXYGEN SATURATION: 96 % | HEART RATE: 66 BPM | TEMPERATURE: 99 F

## 2024-06-04 LAB
AMPHET UR-MCNC: NEGATIVE — SIGNIFICANT CHANGE UP
ANION GAP SERPL CALC-SCNC: 13 MMOL/L — SIGNIFICANT CHANGE UP (ref 5–17)
APAP SERPL-MCNC: <3 UG/ML — LOW (ref 10–26)
APPEARANCE UR: CLEAR — SIGNIFICANT CHANGE UP
BARBITURATES UR SCN-MCNC: NEGATIVE — SIGNIFICANT CHANGE UP
BASOPHILS # BLD AUTO: 0.01 K/UL — SIGNIFICANT CHANGE UP (ref 0–0.2)
BASOPHILS NFR BLD AUTO: 0.1 % — SIGNIFICANT CHANGE UP (ref 0–2)
BENZODIAZ UR-MCNC: NEGATIVE — SIGNIFICANT CHANGE UP
BILIRUB UR-MCNC: NEGATIVE — SIGNIFICANT CHANGE UP
BUN SERPL-MCNC: 7.1 MG/DL — LOW (ref 8–20)
CALCIUM SERPL-MCNC: 9.3 MG/DL — SIGNIFICANT CHANGE UP (ref 8.4–10.5)
CHLORIDE SERPL-SCNC: 103 MMOL/L — SIGNIFICANT CHANGE UP (ref 96–108)
CO2 SERPL-SCNC: 25 MMOL/L — SIGNIFICANT CHANGE UP (ref 22–29)
COCAINE METAB.OTHER UR-MCNC: NEGATIVE — SIGNIFICANT CHANGE UP
COLOR SPEC: YELLOW — SIGNIFICANT CHANGE UP
CREAT SERPL-MCNC: 0.72 MG/DL — SIGNIFICANT CHANGE UP (ref 0.5–1.3)
DIFF PNL FLD: NEGATIVE — SIGNIFICANT CHANGE UP
EGFR: 104 ML/MIN/1.73M2 — SIGNIFICANT CHANGE UP
EOSINOPHIL # BLD AUTO: 0 K/UL — SIGNIFICANT CHANGE UP (ref 0–0.5)
EOSINOPHIL NFR BLD AUTO: 0 % — SIGNIFICANT CHANGE UP (ref 0–6)
ETHANOL SERPL-MCNC: <10 MG/DL — SIGNIFICANT CHANGE UP (ref 0–9)
FENTANYL UR QL SCN: NEGATIVE — SIGNIFICANT CHANGE UP
FLUAV AG NPH QL: SIGNIFICANT CHANGE UP
FLUBV AG NPH QL: SIGNIFICANT CHANGE UP
GLUCOSE SERPL-MCNC: 120 MG/DL — HIGH (ref 70–99)
GLUCOSE UR QL: NEGATIVE MG/DL — SIGNIFICANT CHANGE UP
HCG SERPL-ACNC: <4 MIU/ML — SIGNIFICANT CHANGE UP
HCT VFR BLD CALC: 38.9 % — SIGNIFICANT CHANGE UP (ref 34.5–45)
HGB BLD-MCNC: 13.2 G/DL — SIGNIFICANT CHANGE UP (ref 11.5–15.5)
IMM GRANULOCYTES NFR BLD AUTO: 0.8 % — SIGNIFICANT CHANGE UP (ref 0–0.9)
KETONES UR-MCNC: NEGATIVE MG/DL — SIGNIFICANT CHANGE UP
LEUKOCYTE ESTERASE UR-ACNC: NEGATIVE — SIGNIFICANT CHANGE UP
LYMPHOCYTES # BLD AUTO: 0.67 K/UL — LOW (ref 1–3.3)
LYMPHOCYTES # BLD AUTO: 7.4 % — LOW (ref 13–44)
MCHC RBC-ENTMCNC: 32.8 PG — SIGNIFICANT CHANGE UP (ref 27–34)
MCHC RBC-ENTMCNC: 33.9 GM/DL — SIGNIFICANT CHANGE UP (ref 32–36)
MCV RBC AUTO: 96.5 FL — SIGNIFICANT CHANGE UP (ref 80–100)
METHADONE UR-MCNC: NEGATIVE — SIGNIFICANT CHANGE UP
MONOCYTES # BLD AUTO: 0.18 K/UL — SIGNIFICANT CHANGE UP (ref 0–0.9)
MONOCYTES NFR BLD AUTO: 2 % — SIGNIFICANT CHANGE UP (ref 2–14)
NEUTROPHILS # BLD AUTO: 8.09 K/UL — HIGH (ref 1.8–7.4)
NEUTROPHILS NFR BLD AUTO: 89.7 % — HIGH (ref 43–77)
NITRITE UR-MCNC: NEGATIVE — SIGNIFICANT CHANGE UP
OPIATES UR-MCNC: NEGATIVE — SIGNIFICANT CHANGE UP
PCP SPEC-MCNC: SIGNIFICANT CHANGE UP
PCP UR-MCNC: NEGATIVE — SIGNIFICANT CHANGE UP
PH UR: 6.5 — SIGNIFICANT CHANGE UP (ref 5–8)
PLATELET # BLD AUTO: 322 K/UL — SIGNIFICANT CHANGE UP (ref 150–400)
POTASSIUM SERPL-MCNC: 4.2 MMOL/L — SIGNIFICANT CHANGE UP (ref 3.5–5.3)
POTASSIUM SERPL-SCNC: 4.2 MMOL/L — SIGNIFICANT CHANGE UP (ref 3.5–5.3)
PROT UR-MCNC: NEGATIVE MG/DL — SIGNIFICANT CHANGE UP
RBC # BLD: 4.03 M/UL — SIGNIFICANT CHANGE UP (ref 3.8–5.2)
RBC # FLD: 12.5 % — SIGNIFICANT CHANGE UP (ref 10.3–14.5)
RSV RNA NPH QL NAA+NON-PROBE: SIGNIFICANT CHANGE UP
SALICYLATES SERPL-MCNC: <0.6 MG/DL — LOW (ref 10–20)
SARS-COV-2 RNA SPEC QL NAA+PROBE: SIGNIFICANT CHANGE UP
SODIUM SERPL-SCNC: 141 MMOL/L — SIGNIFICANT CHANGE UP (ref 135–145)
SP GR SPEC: 1.01 — SIGNIFICANT CHANGE UP (ref 1–1.03)
THC UR QL: POSITIVE
UROBILINOGEN FLD QL: 1 MG/DL — SIGNIFICANT CHANGE UP (ref 0.2–1)
WBC # BLD: 9.02 K/UL — SIGNIFICANT CHANGE UP (ref 3.8–10.5)
WBC # FLD AUTO: 9.02 K/UL — SIGNIFICANT CHANGE UP (ref 3.8–10.5)

## 2024-06-04 PROCEDURE — 81003 URINALYSIS AUTO W/O SCOPE: CPT

## 2024-06-04 PROCEDURE — 99285 EMERGENCY DEPT VISIT HI MDM: CPT

## 2024-06-04 PROCEDURE — 80048 BASIC METABOLIC PNL TOTAL CA: CPT

## 2024-06-04 PROCEDURE — 87637 SARSCOV2&INF A&B&RSV AMP PRB: CPT

## 2024-06-04 PROCEDURE — 93005 ELECTROCARDIOGRAM TRACING: CPT

## 2024-06-04 PROCEDURE — 70450 CT HEAD/BRAIN W/O DYE: CPT | Mod: 26,MC

## 2024-06-04 PROCEDURE — 36415 COLL VENOUS BLD VENIPUNCTURE: CPT

## 2024-06-04 PROCEDURE — 84702 CHORIONIC GONADOTROPIN TEST: CPT

## 2024-06-04 PROCEDURE — 99285 EMERGENCY DEPT VISIT HI MDM: CPT | Mod: 25

## 2024-06-04 PROCEDURE — 85025 COMPLETE CBC W/AUTO DIFF WBC: CPT

## 2024-06-04 PROCEDURE — 93010 ELECTROCARDIOGRAM REPORT: CPT

## 2024-06-04 PROCEDURE — 80307 DRUG TEST PRSMV CHEM ANLYZR: CPT

## 2024-06-04 PROCEDURE — 70450 CT HEAD/BRAIN W/O DYE: CPT | Mod: MC

## 2024-06-04 NOTE — ED ADULT NURSE NOTE - OBJECTIVE STATEMENT
Presented in  area dressed in yellow gowns.  Patient is awake and alert, speech is clear with a low tone.  Patient affect is sad and tearful during assessment.  Patient reports she has been overwhelmed with stress from possibility of losing her job and her apartment.  Patient is estranged from her daughter.  Patient has chronic pain issues from her herniated discs in her back.  Patient has been seeking psychiatric treatment but limited due to her insurance.  Patient began having passive suicidal ideations feeling she would be better off dead but her believe in God stops her from committing suicide.  No past psychiatric treatment.  Cooperative with security contraband assessment and securing belongings in  locker.  Patient reported fell last night and hit her head.

## 2024-06-04 NOTE — ED ADULT NURSE NOTE - ACTIVATING EVENTS/STRESSORS
Triggering events leading to humiliation, shame, and/or despair (e.g. Loss of relationship, financial or health status) (real or anticipated)/Pending incarceration or homelessness/Non-compliant or not receiving treatment

## 2024-06-04 NOTE — ED ADULT NURSE NOTE - CHIEF COMPLAINT QUOTE
pt states she slipped & fell last night, c/o low back pain, ? LOC, hit her head   A&Ox3, resp wnl, HX anxiety, + bruise to rt eye  sent from [facility]
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
## 2024-06-04 NOTE — ED ADULT NURSE REASSESSMENT NOTE - NS ED NURSE REASSESS COMMENT FT1
Assumed patient care at 7:00 PM. New arrival to unit, patient in room eating. Patient is also noted with tears and stated she just lost her job. Emotional support given. Patient complied with care, denied visual and auditory hallucination, passive suicidal thoughts reported. Flat affect, patient stated she I very depressed. No attempt to self harm, no attempt to harm others, safety maintained.

## 2024-06-04 NOTE — ED PROVIDER NOTE - PHYSICAL EXAMINATION
Constitutional: Awake, Alert  HEAD: Normocephalic, atraumatic. No hematomas, contusions, lacerations, or signs of trauma seen on head/face/scalp.  EYES: PERRL, EOM intact, conjunctiva and sclera are clear bilaterally.  ENT: External ears normal. No rhinorrhea, no tracheal deviation   NECK: Supple, non-tender  CARDIOVASCULAR: regular rate and rhythm.  RESPIRATORY: Normal respiratory effort; breath sounds CTAB, no wheezes, rhonchi, or rales. Speaking in full sentences. No accessory muscle use.   ABDOMEN: Soft; non-tender, non-distended. No rebound or guarding.   MSK:  no lower extremity edema, no deformities  SKIN: Warm, dry, bruising right forearm and right mid back  NEURO: A&O x3. Sensory and motor functions are grossly intact. Speech is normal. No facial droop. ambulates with slow gait and hunched back  PSYCH: +tearful, +SI with no plan
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
## 2024-06-04 NOTE — ED ADULT TRIAGE NOTE - CHIEF COMPLAINT QUOTE
pt states she slipped & fell last night, c/o low back pain, ? LOC, hit her head   A&Ox3, resp wnl, HX anxiety, + bruise to rt eye  sent from [facility]

## 2024-06-04 NOTE — ED PROVIDER NOTE - PATIENT PORTAL LINK FT
You can access the FollowMyHealth Patient Portal offered by [facility] by registering at the following website: [email or web address]. By joining [employer]’s FollowMyHealth portal, you will also be able to view your health information using other applications (apps) compatible with our system.

## 2024-06-05 VITALS
RESPIRATION RATE: 18 BRPM | OXYGEN SATURATION: 100 % | HEART RATE: 64 BPM | DIASTOLIC BLOOD PRESSURE: 75 MMHG | SYSTOLIC BLOOD PRESSURE: 147 MMHG | TEMPERATURE: 98 F

## 2024-06-05 DIAGNOSIS — F33.1 MAJOR DEPRESSIVE DISORDER, RECURRENT, MODERATE: ICD-10-CM

## 2024-06-05 PROCEDURE — 90792 PSYCH DIAG EVAL W/MED SRVCS: CPT | Mod: 95

## 2024-06-05 NOTE — ED BEHAVIORAL HEALTH ASSESSMENT NOTE - REFERRAL / APPOINTMENT DETAILS
Patient was set up for an intake appt at the [facility] in [place] on June 7th

## 2024-06-05 NOTE — ED ADULT NURSE REASSESSMENT NOTE - NS ED NURSE REASSESS COMMENT FT1
Patient interviewed by tele psych, plan   Treat and Release  Patient was set up for an intake appt at the [facility] in [place] on June 7th  Patient can discuss potential medication options with her outpt providers.   awaiting for discharge order, safety maintained.
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
## 2024-06-05 NOTE — ED BEHAVIORAL HEALTH ASSESSMENT NOTE - RELATIONSHIP
"Maya Ribera (63 y.o. Female)                               ATTENTION;      CASE REF # [id number]  - CLINICALS FOR REVIEW, REPLY TO [facility] DEPT                              JERRI RASHEED LPN  [phone] OR CALL                  Date of Birth   1958    Social Security Number       Address   [address] [place] [address]    Home Phone   [phone]    MRN   [id number]       Taoist   None    Marital Status   Single                            Admission Date   4/29/22    Admission Type   Elective    Admitting Provider   Hayder Finch III, MD    Attending Provider   Hayder Finch III, MD    Department, Room/Bed   [facility] CORONARY [facility], N339/1       Discharge Date       Discharge Disposition       Discharge Destination                               Attending Provider: Hayder Finch III, MD    Allergies: Codeine    Isolation: None   Infection: None   Code Status: CPR   Advance Care Planning Activity    Ht: 162.6 cm (64.02\")   Wt: 52.8 kg (116 lb 6.5 oz)    Admission Cmt: None   Principal Problem: Esophageal cancer (HCC) [C15.9]                 Active Insurance as of 4/29/2022     Primary Coverage     Payor Plan Insurance Group Employer/Plan Group    [facility] BLUE CROSS [id number]     Payor Plan Address Payor Plan Phone Number Payor Plan Fax Number Effective Dates    [address]   1/2/2010 - None Entered    [place] [address]       Subscriber Name Subscriber Birth Date Member ID       MAYA RIBERA 1958 [id number]                 Emergency Contacts      (Rel.) Home Phone Work Phone Mobile Phone    magan paz (Significant Other) [phone] -- [phone]    Haile Ribera (Son) [phone] -- [phone]    BE RIBERA (Sister) -- -- [phone]               History & Physical      Hayder Finch III, MD at 04/29/22 0653          H&P reviewed. The patient was examined and there are no changes to the H&P.  " "        Electronically signed by Hayder Finch III, MD at 04/29/22 0654   Source Note        Chief Complaint  Adenocarcinoma of the lower third of the esophagus    Nazanin Ribera presents to [facility] THORACIC SURGERY  History of Present Illness     Ms. 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  In December 2021 she was diagnosed with a T3 N1 M0 adenocarcinoma of the esophagus.  Dr. Ruiz of general surgery placed a port and a feeding jejunostomy.  She was started on neoadjuvant radiation chemotherapy.  Chemotherapy had to be interrupted several times because of pancytopenia.  She completed her radiation and chemotherapy February 14.  She has had a subsequent CT PET scan and has been referred back here for consideration for surgical resection.    Prior to her diagnosis she weighed 135 pounds.  Today she weighs 117 pounds.  She has been tolerating the tube feedings.  She did have a setback last week with some gastroenteritis but is gained back 2 pounds following that episode.  She is able to take liquids with minimal difficulty.  She has not tried to take any solid food.  She has no nausea or vomiting.  She has had no hematemesis.    Objective   Vital Signs:   /62 (BP Location: Right arm, Patient Position: Sitting, Cuff Size: Adult)   Pulse 78   Ht 162.6 cm (64.02\")   Wt 52.2 kg (115 lb)   SpO2 98%   BMI 19.73 kg/m²     BMI is within normal parameters. No follow-up required.      Physical Exam     General: Thin somewhat cachectic.  Normal gait.  Able to get up out of the chair on her own and onto the exam table without assistance.    Neck: No masses and no adenopathy    Chest: Lungs are clear to auscultation with equal breath sounds bilaterally    Cardiac: Regular rate and rhythm.  No murmurs or gallops.  No dependent edema.    Abdomen: Soft and nontender.  Feeding jejunostomy tube is in place. "  Some maceration of the skin around the tube site.  Sutures have pulled through the skin.  New suture was placed today without difficulty.    Result Review :   The following data was reviewed by: Hayder Finch III, MD on 03/31/2022:    CT PET scan performed March 17, 2022 was independently reviewed.  There has been decrease in size of the long segment of intense metabolic uptake in the distal esophagus.  The degree of uptake has also decreased from previously 16 now down to 2.8.  There has been decrease in size of gastrohepatic ligament lymph nodes and left hilar lymph nodes.  These lymph nodes are no longer hypermetabolic.  No evidence for distal metastases.  An area of hypermetabolism in the apex of the right lung appears to be inflammatory in nature.         Assessment and Plan    Diagnoses and all orders for this visit:    1. Malignant neoplasm of lower third of esophagus (HCC) (Primary)  -     Case Request      It appears that this lady has had a good result from her neoadjuvant therapy.  I had a long discussion with her and her partner about robot-assisted total esophagectomy.  I have explained the procedure as well as the risks and benefits.  I have answered all of their questions to their satisfaction.  The patient has requested that we proceed with surgery.  We will continue tube feedings up until the time of surgery to help support her nutrition.  Hope to schedule surgery to be done within the next 2 weeks.    Case request and preoperative orders have been placed in [facility].  Patient will be scheduled for robot-assisted total esophagectomy.  I will keep you informed of her progress.    I spent 31 minutes caring for Maya on this date of service. This time includes time spent by me in the following activities:preparing for the visit, reviewing tests, performing a medically appropriate examination and/or evaluation , counseling and educating the patient/family/caregiver, ordering medications, tests, or  "procedures, referring and communicating with other health care professionals , documenting information in the medical record, independently interpreting results and communicating that information with the patient/family/caregiver and care coordination  Follow Up   Return for Return to the office following surgery.  Patient was given instructions and counseling regarding her condition or for health maintenance advice. Please see specific information pulled into the AVS if appropriate.         Electronically signed by Hayder Finch III, MD at 03/31/22 1205             Hayder Finch III, MD at 03/31/22 1030        Chief Complaint  Adenocarcinoma of the lower third of the esophagus    Subjective          Maya Ribera presents to [facility] THORACIC SURGERY  History of Present Illness     Ms. 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  In December 2021 she was diagnosed with a T3 N1 M0 adenocarcinoma of the esophagus.  Dr. Ruiz of general surgery placed a port and a feeding jejunostomy.  She was started on neoadjuvant radiation chemotherapy.  Chemotherapy had to be interrupted several times because of pancytopenia.  She completed her radiation and chemotherapy February 14.  She has had a subsequent CT PET scan and has been referred back here for consideration for surgical resection.    Prior to her diagnosis she weighed 135 pounds.  Today she weighs 117 pounds.  She has been tolerating the tube feedings.  She did have a setback last week with some gastroenteritis but is gained back 2 pounds following that episode.  She is able to take liquids with minimal difficulty.  She has not tried to take any solid food.  She has no nausea or vomiting.  She has had no hematemesis.    Objective   Vital Signs:   /62 (BP Location: Right arm, Patient Position: Sitting, Cuff Size: Adult)   Pulse 78   Ht 162.6 cm (64.02\")   Wt 52.2 " kg (115 lb)   SpO2 98%   BMI 19.73 kg/m²     BMI is within normal parameters. No follow-up required.      Physical Exam     General: Thin somewhat cachectic.  Normal gait.  Able to get up out of the chair on her own and onto the exam table without assistance.    Neck: No masses and no adenopathy    Chest: Lungs are clear to auscultation with equal breath sounds bilaterally    Cardiac: Regular rate and rhythm.  No murmurs or gallops.  No dependent edema.    Abdomen: Soft and nontender.  Feeding jejunostomy tube is in place.  Some maceration of the skin around the tube site.  Sutures have pulled through the skin.  New suture was placed today without difficulty.    Result Review :   The following data was reviewed by: Hayder Finch III, MD on 03/31/2022:    CT PET scan performed March 17, 2022 was independently reviewed.  There has been decrease in size of the long segment of intense metabolic uptake in the distal esophagus.  The degree of uptake has also decreased from previously 16 now down to 2.8.  There has been decrease in size of gastrohepatic ligament lymph nodes and left hilar lymph nodes.  These lymph nodes are no longer hypermetabolic.  No evidence for distal metastases.  An area of hypermetabolism in the apex of the right lung appears to be inflammatory in nature.         Assessment and Plan    Diagnoses and all orders for this visit:    1. Malignant neoplasm of lower third of esophagus (HCC) (Primary)  -     Case Request      It appears that this lady has had a good result from her neoadjuvant therapy.  I had a long discussion with her and her partner about robot-assisted total esophagectomy.  I have explained the procedure as well as the risks and benefits.  I have answered all of their questions to their satisfaction.  The patient has requested that we proceed with surgery.  We will continue tube feedings up until the time of surgery to help support her nutrition.  Hope to schedule surgery to be  done within the next 2 weeks.    Case request and preoperative orders have been placed in epic.  Patient will be scheduled for robot-assisted total esophagectomy.  I will keep you informed of her progress.    I spent 31 minutes caring for Maya on this date of service. This time includes time spent by me in the following activities:preparing for the visit, reviewing tests, performing a medically appropriate examination and/or evaluation , counseling and educating the patient/family/caregiver, ordering medications, tests, or procedures, referring and communicating with other health care professionals , documenting information in the medical record, independently interpreting results and communicating that information with the patient/family/caregiver and care coordination  Follow Up   Return for Return to the office following surgery.  Patient was given instructions and counseling regarding her condition or for health maintenance advice. Please see specific information pulled into the AVS if appropriate.         Electronically signed by Hayder Finch III, MD at 03/31/22 1205       Vital Signs (last 7 days)     Date/Time Temp Temp src Pulse Resp BP Patient Position SpO2    05/02/22 1200 -- -- 81 -- 104/46 -- 97    05/02/22 1130 -- -- 86 -- 110/50 -- 99    05/02/22 1100 -- -- 83 -- 105/47 -- 99    05/02/22 1030 -- -- 85 -- 96/51 -- 98    05/02/22 1000 -- -- 80 18 90/47 Lying 98    05/02/22 0901 98.2 (36.8) Oral 85 20 104/57 Lying 97    05/02/22 0851 -- -- 85 -- -- -- 96    05/02/22 0850 -- -- 89 -- 86/50 -- 95    05/02/22 0845 -- -- 142 -- 89/56 -- 96    05/02/22 0840 -- -- 146 -- 107/75 -- 96    05/02/22 0830 -- -- 151 -- 76/54 -- 96    05/02/22 0800 -- -- 152 -- 92/79 -- 96    05/02/22 0700 -- -- 117 -- 101/41 -- 93    05/02/22 0600 -- -- 110 -- 118/50 -- 97    05/02/22 0500 -- -- 98 -- 114/45 -- 97    05/02/22 0400 -- -- 90 -- 90/67 -- 99    05/02/22 0300 97.9 (36.6) Oral 104 20 107/48 Lying 100    Comment  rows:    OBSERV: sleeping at 05/02/22 0300    05/02/22 0200 -- -- 97 -- 87/36 -- 98    05/02/22 0100 -- -- 96 -- 63/44 -- 98    05/02/22 0000 -- -- 85 22 88/48 Lying 97    05/01/22 2300 97.9 (36.6) Oral 86 20 82/58 Lying 98    Comment rows:    OBSERV: resting in bed at 05/01/22 2300 05/01/22 2230 -- -- 83 -- -- -- 97    05/01/22 2200 -- -- 151 -- 81/47 -- 97    05/01/22 2130 -- -- 129 -- 73/54 -- 97    05/01/22 2115 -- -- 128 -- -- -- 96    05/01/22 2100 -- -- 144 -- 98/56 -- 96    05/01/22 2045 -- -- 147 -- -- -- 96    05/01/22 2030 -- -- 160 -- 96/51 -- 98    05/01/22 2015 -- -- 165 -- -- -- 99    05/01/22 2000 98.6 (37) Oral 141 22 75/60 Lying 97    Comment rows:    OBSERV: Back in bed resting at 05/01/22 2000 05/01/22 1945 -- -- 130 -- -- -- 94    05/01/22 1930 -- -- 172 -- 79/51 -- 94    05/01/22 1915 -- -- 181 -- -- -- 94    05/01/22 1900 -- -- 199 -- 74/56 -- 97    05/01/22 1845 -- -- 117 -- -- -- 93    05/01/22 1830 -- -- 116 -- 89/56 -- 97    05/01/22 1815 -- -- 93 -- -- -- 97    05/01/22 1800 -- -- 96 -- 128/57 -- 96    05/01/22 1730 -- -- 100 -- 117/60 -- 95    05/01/22 1700 -- -- 94 -- 122/55 -- 93    05/01/22 1630 -- -- 89 -- 127/60 -- 96    05/01/22 1600 -- -- 83 -- 119/61 -- 96    05/01/22 1530 -- -- 112 -- 111/49 -- 94    05/01/22 1500 -- -- 89 -- 116/53 -- 95    05/01/22 1430 -- -- 101  -- 116/46 -- 96    Pulse: Dr. Antony notified of pt having Vruns, order to continue to monitor HR and BP. at 05/01/22 1430    05/01/22 1400 -- -- 98 -- 115/54 -- 94    05/01/22 1330 -- -- 74 -- 108/84 -- 96    05/01/22 1300 -- -- 71 -- 119/57 -- 97    05/01/22 1230 -- -- 74 -- 120/57 -- 96    05/01/22 1200 -- -- 66 -- 123/72 Lying 96    05/01/22 1100 -- -- 68 -- 121/62 -- 94    05/01/22 1030 -- -- 89 -- 123/57 -- 93    05/01/22 1000 -- -- 99 -- 120/54 -- 92    05/01/22 0930 -- -- 85 -- 112/55 -- 93    05/01/22 0900 -- -- 107 -- 131/56 -- 90    05/01/22 0830 -- -- 87 20 132/56 Lying 93    05/01/22 0800 -- -- 92 --  129/62 -- 91    05/01/22 0730 -- -- 82 -- 129/64 -- 93    05/01/22 0700 98 (36.7) Oral 82 -- 127/62 -- 94    05/01/22 0630 -- -- 77 -- 129/57 -- 94    05/01/22 0600 -- -- 77 -- 124/57 -- 96    05/01/22 0530 -- -- 73 -- 118/69 -- 95    05/01/22 0500 -- -- 77 -- 114/65 -- 95    05/01/22 0430 -- -- 73 -- 123/63 -- 96    05/01/22 0400 -- -- 84 -- 122/56 -- 97    05/01/22 0330 -- -- 69 -- 110/56 -- 98    05/01/22 0300 -- -- 67 -- 95/50 -- 96    05/01/22 0230 -- -- 60 -- 114/54 -- 96    05/01/22 0200 -- -- 61 -- 111/54 -- 97    05/01/22 0130 -- -- 62 -- 103/54 -- 96    05/01/22 0100 -- -- 62 -- 105/52 -- 96    05/01/22 0030 -- -- 61 -- 105/50 -- 97    05/01/22 0000 -- -- 62 -- 105/51 -- 94    04/30/22 2300 -- -- 62 -- 102/54 -- 92    04/30/22 2200 -- -- 63 -- 105/51 -- 96    04/30/22 2100 -- -- 65 -- 100/48 -- 94    04/30/22 2030 -- -- 65 -- 105/51 -- 93    04/30/22 2000 -- -- 71 -- 117/55 -- 91    04/30/22 1930 -- -- 73 -- -- -- 98    04/30/22 1900 -- -- 62 -- 105/39 -- 97    04/30/22 1830 -- -- 63 -- 110/44 -- 97    04/30/22 1800 -- -- 82 -- 106/44 Sitting 95    04/30/22 1730 -- -- 66 -- 100/44 -- 95    04/30/22 1700 -- -- 66 -- 111/48 -- 95    04/30/22 1630 -- -- 67 -- 91/51 -- 96    04/30/22 1600 -- -- 67 -- 98/50 -- 95    04/30/22 1500 -- -- 64 -- 94/47 -- 92    04/30/22 1445 -- -- 74 -- 105/42 -- 89    04/30/22 1200 -- -- 63 -- 98/51 -- 93    04/30/22 0700 -- -- 66 -- 101/42 -- 94    04/30/22 0600 -- -- 67 -- 110/48 -- 95    04/30/22 0400 -- -- 60 -- 101/46 -- --    04/30/22 0349 97.5 (36.4) Oral -- -- -- -- --    04/30/22 0300 -- -- 66 -- 98/50 -- --    04/30/22 0200 -- -- 65 -- 100/42 -- --    04/30/22 0130 -- -- 61 -- 94/48 -- 93    04/30/22 0100 -- -- 61 -- 100/45 -- 93    04/30/22 0030 -- -- 61 -- 102/41 -- 93    04/30/22 0000 -- -- 62 -- 104/50 -- 94    04/29/22 2356 97.7 (36.5) Oral -- -- -- -- --    04/29/22 2230 -- -- 59 -- -- -- 94    04/29/22 2200 -- -- 60 -- -- -- 94    04/29/22 2130 -- -- 61 -- -- -- 96     04/29/22 2100 -- -- 62 -- -- -- 95    04/29/22 2030 -- -- 60 -- -- -- 96    04/29/22 2000 -- -- 62 -- -- -- 96    04/29/22 1950 98 (36.7) Oral -- -- -- -- --    04/29/22 1930 -- -- 60 -- -- -- 97    04/29/22 1900 -- -- 61 -- -- -- 94    04/29/22 1830 -- -- 61 -- -- -- 94    04/29/22 1800 -- -- 64 -- -- -- 96    04/29/22 1500 98 (36.7) Oral 70 14 115/58 -- 96    04/29/22 1445 -- -- 72 14 118/58 -- 97    04/29/22 1430 -- -- 73 14 112/62 -- 96    04/29/22 1415 -- -- 72 14 118/65 -- 97    04/29/22 1400 -- -- 72 14 112/60 -- 96    04/29/22 1345 -- -- 74 14 114/64 -- 96    04/29/22 1330 -- -- 77 14 107/57 -- 97    04/29/22 1325 -- -- 83 16 114/57 -- 97    04/29/22 1320 -- -- 87 18 141/102 -- 94    04/29/22 1316 98.1 (36.7) Oral 89 16 132/66 Lying 95    04/29/22 07:03:08 -- -- 76 16 141/69 -- 100    04/29/22 06:54:17 -- -- 79 16 -- -- 100    04/29/22 06:49:40 -- -- 80 15 -- -- 100    04/29/22 0604 97.9 (36.6) Oral 82 16 126/80 Lying 98          Oxygen Therapy (last 7 days)     Date/Time SpO2 Device (Oxygen Therapy) Flow (L/min) Oxygen Concentration (%) ETCO2 (mmHg)    05/02/22 1200 97 -- -- -- --    05/02/22 1130 99 -- -- -- --    05/02/22 1100 99 -- -- -- --    05/02/22 1030 98 -- -- -- --    05/02/22 1000 98 -- -- -- --    05/02/22 0901 97 nasal cannula 2 -- --    05/02/22 0851 96 -- -- -- --    05/02/22 0850 95 -- -- -- --    05/02/22 0845 96 -- -- -- --    05/02/22 0840 96 -- -- -- --    05/02/22 0830 96 -- -- -- --    05/02/22 0800 96 nasal cannula 2 -- --    05/02/22 0700 93 -- -- -- --    05/02/22 0600 97 -- -- -- --    05/02/22 0500 97 -- -- -- --    05/02/22 0400 99 -- 2 -- --    05/02/22 0300 100 nasal cannula 2 -- --    05/02/22 0200 98 -- -- -- --    05/02/22 0100 98 -- -- -- --    05/02/22 0000 97 nasal cannula 2 -- --    05/01/22 2300 98 nasal cannula 2 -- --    05/01/22 2230 97 -- -- -- --    05/01/22 2200 97 -- -- -- --    05/01/22 2130 97 -- -- -- --    05/01/22 2115 96 -- -- -- --    05/01/22 2100 96  -- -- -- --    05/01/22 2045 96 -- -- -- --    05/01/22 2030 98 -- -- -- --    05/01/22 2015 99 -- -- -- --    05/01/22 2000 97 nasal cannula 2 -- --    05/01/22 1945 94 -- -- -- --    05/01/22 1930 94 -- -- -- --    05/01/22 1915 94 -- -- -- --    05/01/22 1900 97 -- -- -- --    05/01/22 1845 93 -- -- -- --    05/01/22 1830 97 -- -- -- --    05/01/22 1815 97 -- -- -- --    05/01/22 1800 96 -- -- -- --    05/01/22 1730 95 -- -- -- --    05/01/22 1700 93 -- -- -- --    05/01/22 1630 96 -- -- -- --    05/01/22 1600 96 -- 2 -- --    05/01/22 1530 94 -- -- -- --    05/01/22 1500 95 -- -- -- --    05/01/22 1430 96 -- -- -- --    05/01/22 1400 94 -- -- -- --    05/01/22 1330 96 -- -- -- --    05/01/22 1300 97 -- -- -- --    05/01/22 1230 96 -- -- -- --    05/01/22 1200 96 nasal cannula 2 -- --    05/01/22 1100 94 -- -- -- --    05/01/22 1030 93 -- -- -- --    05/01/22 1000 92 -- -- -- --    05/01/22 0930 93 -- -- -- --    05/01/22 0900 90 -- -- -- --    05/01/22 0830 93 nasal cannula 2 -- --    05/01/22 0800 91 nasal cannula 2 -- --    05/01/22 0730 93 -- -- -- --    05/01/22 0700 94 -- -- -- --    05/01/22 0630 94 -- -- -- --    05/01/22 0600 96 -- -- -- --    05/01/22 0530 95 -- -- -- --    05/01/22 0500 95 -- -- -- --    05/01/22 0430 96 -- -- -- --    05/01/22 0405 -- nasal cannula 2 -- --    05/01/22 0400 97 -- -- -- --    05/01/22 0330 98 -- -- -- --    05/01/22 0300 96 -- -- -- --    05/01/22 0230 96 -- -- -- --    05/01/22 0200 97 -- -- -- --    05/01/22 0130 96 -- -- -- --    05/01/22 0100 96 -- -- -- --    05/01/22 0030 97 nasal cannula 2 -- --    05/01/22 0000 94 -- -- -- --    04/30/22 2300 92 -- -- -- --    04/30/22 2200 96 -- -- -- --    04/30/22 2100 94 -- -- -- --    04/30/22 2030 93 -- -- -- --    04/30/22 2007 -- nasal cannula 2 -- --    04/30/22 2000 91 -- -- -- --    04/30/22 1930 98 -- -- -- --    04/30/22 1900 97 -- -- -- --    04/30/22 1830 97 -- -- -- --    04/30/22 1800 95 -- -- -- --    04/30/22  1730 95 -- -- -- --    04/30/22 1700 95 -- -- -- --    04/30/22 1630 96 -- -- -- --    04/30/22 1600 95 -- -- -- --    04/30/22 1500 92 -- -- -- --    04/30/22 1445 89 -- -- -- --    04/30/22 1200 93 -- -- -- --    04/30/22 0800 -- nasal cannula 2 -- --    04/30/22 0700 94 -- -- -- --    04/30/22 0600 95 -- -- -- --    04/30/22 0415 -- nasal cannula 2 -- --    04/30/22 0130 93 -- -- -- --    04/30/22 0100 93 -- -- -- --    04/30/22 0030 93 -- -- -- --    04/30/22 0020 -- nasal cannula 2 -- --    04/30/22 0000 94 -- -- -- --    04/29/22 2230 94 -- -- -- --    04/29/22 2200 94 -- -- -- --    04/29/22 2130 96 -- -- -- --    04/29/22 2100 95 -- -- -- --    04/29/22 2055 -- room air -- -- --    04/29/22 2030 96 -- -- -- --    04/29/22 2000 96 -- -- -- --    04/29/22 1930 97 -- -- -- --    04/29/22 1900 94 -- -- -- --    04/29/22 1830 94 -- -- -- --    04/29/22 1800 96 -- -- -- --    04/29/22 1600 -- room air -- 2 --    04/29/22 1500 96 nasal cannula 2 -- --    04/29/22 1445 97 nasal cannula 3 -- --    04/29/22 1430 96 nasal cannula 3 -- --    04/29/22 1415 97 nasal cannula 4 -- --    04/29/22 1400 96 nasal cannula 4 -- --    04/29/22 1345 96 nasal cannula 4 -- --    04/29/22 1330 97 nasal cannula 4 -- --    04/29/22 1325 97 nasal cannula 4 -- --    04/29/22 1320 94 nasal cannula 4 -- --    04/29/22 1316 95 nasal cannula 4 -- --    04/29/22 07:03:08 100 -- -- -- --    04/29/22 06:54:17 100 -- -- -- --    04/29/22 06:49:40 100 -- -- -- --    04/29/22 0604 98 room air -- -- --        Intake & Output (last 7 days)       04/25 0701 04/26 0700 04/26 0701 04/27 0700 04/27 0701 04/28 0700 04/28 0701 04/29 0700 04/29 0701 04/30 0700 04/30 0701 05/01 0700 05/01 0701 05/02 0700 05/02 0701  05/03 0700    P.O.    360 0 0 0     I.V. (mL/kg)     5018 (94.1) 1832 (34.4) 2398 (45.4)     Other     120 160 90 30    NG/GT      84 159     IV Piggyback     600       Total Intake(mL/kg)    360 (6.8) 5738 (107.7) 2076 (38.9) 2647 (50.1)  30 (0.6)    Urine (mL/kg/hr)     730 (0.6) 500 (0.4) 1325 (1) 250 (0.9)    Emesis/NG output     0 20 35     Stool     0  0     Blood     100       Chest Tube     350 360 300 0    Total Output     8033 587 5246 250    Net    +360 +4558 +[phone]                      Lines, Drains & Airways     Active LDAs     Name Placement date Placement time Site Days    Peripheral IV 04/29/22 0612 Left;Posterior Forearm 04/29/22  0612  Forearm  3    Peripheral IV 04/29/22 0613 Posterior;Right Forearm 04/29/22  0613  Forearm  3    Chest Tube 1 Right 04/29/22  1008  --  3    NG/OG Tube Nasogastric Right nostril 04/29/22  1200  Right nostril  3    Gastrostomy/Enterostomy Jejunostomy 1 18 Fr. LUQ 04/29/22  1210  LUQ  3    Urethral Catheter Silicone;Straight-tip;Double-lumen 16 Fr. 04/29/22  0824  -- 3    Single Lumen Implantable Port 12/15/21 Left Chest 12/15/21  1243  Chest  137          Inactive LDAs     Name Placement date Placement time Removal date Removal time Site Days    [REMOVED] Gastrostomy/Enterostomy Jejunostomy 1 20 Fr. LLQ 12/15/21  1323  04/29/22  1045  LLQ  134    [REMOVED] ETT  04/29/22  0741  created via procedure documentation  04/29/22  1309  -- less than 1    [REMOVED] Arterial Line 04/29/22 Left Radial 04/29/22  0655  created via procedure documentation  04/30/22  1031  Radial  1    [REMOVED] Single Lumen Implantable Port Chest --  --  04/29/22  duplicate entry  0611  Chest  --                  Facility-Administered Medications as of 5/2/2022   Medication Dose Route Frequency Provider Last Rate Last Admin   • acetaminophen (TYLENOL) tablet 1,000 mg  1,000 mg Per J Tube TID Hayder Finch III, MD   1,000 mg at 05/02/22 0828   • [COMPLETED] ceFAZolin in dextrose (ANCEF) IVPB solution 2 g  2 g Intravenous Once Hayder Finch III, MD   2 g at 04/29/22 1120   • [COMPLETED] ceFAZolin in dextrose (ANCEF) IVPB solution 2 g  2 g Intravenous Q8H Hayder Finch III, MD   2 g at 04/30/22 0222   • [COMPLETED]  digoxin (LANOXIN) injection 250 mcg  250 mcg Intravenous Once Marquise Antony MD   250 mcg at 05/01/22 0912   • [COMPLETED] digoxin (LANOXIN) injection 250 mcg  250 mcg Intravenous Once Jim Tomas MD   500 mcg at 05/01/22 2058   • Enoxaparin Sodium (LOVENOX) syringe 40 mg  40 mg Subcutaneous Daily Hayder Finch III, MD   40 mg at 05/02/22 0827   • [COMPLETED] famotidine (PEPCID) injection 20 mg  20 mg Intravenous Once Nathan Stroud MD   20 mg at 04/29/22 0643   • [COMPLETED] fentaNYL citrate (PF) (SUBLIMAZE) injection   Intravenous Code / Trauma / Sedation Medication Nathan Stroud MD   25 mcg at 04/29/22 0646   • Gabapentin (NEURONTIN) 50 mg/mL solution 300 mg  300 mg Per J Tube TID Hayder Finch III, MD   300 mg at 05/02/22 0827   • [COMPLETED] heparin (porcine) 5000 UNIT/ML injection 5,000 Units  5,000 Units Subcutaneous Once Hayder Finch III, MD   5,000 Units at 04/29/22 0721   • HYDROmorphone (DILAUDID) injection 1 mg  1 mg Intravenous Q2H PRN Marquise Antony MD   1 mg at 05/02/22 0647   • ketorolac (TORADOL) injection 15 mg  15 mg Intravenous Q6H PRN Hayder Finch III, MD   15 mg at 05/01/22 1349   • lactated ringers infusion  9 mL/hr Intravenous Continuous Hayder Finch III, MD 9 mL/hr at 04/29/22 0728 New Bag at 04/29/22 1218   • LORazepam (ATIVAN) 2 MG/ML concentrated solution 1 mg  1 mg Oral Q8H PRN Hayder Finch III, MD       • methIMAzole (TAPAZOLE) tablet 10 mg  10 mg Per J Tube Daily Joan Moseley MD   10 mg at 05/02/22 0827   • [COMPLETED] metoprolol tartrate (LOPRESSOR) injection 5 mg  5 mg Intravenous Once Joan Moseley MD   5 mg at 05/01/22 1937   • metoprolol tartrate (LOPRESSOR) tablet 12.5 mg  12.5 mg Per J Tube Q12H Marquise Antony MD   12.5 mg at 05/02/22 0827   • [COMPLETED] metoprolol tartrate (LOPRESSOR) tablet 12.5 mg  12.5 mg Per J Tube Once Jim Tomas MD   12.5 mg at 05/01/22 2059   • [COMPLETED] midazolam (VERSED) injection    Intravenous Code / Trauma / Sedation Medication Nathan Stroud MD   1 mg at 04/29/22 0645   • nitroglycerin (NITROSTAT) SL tablet 0.4 mg  0.4 mg Sublingual Q5 Min PRN Hayder Finch III, MD       • ondansetron (ZOFRAN) tablet 4 mg  4 mg Oral Q6H PRN Hayder Finch III, MD        Or   • ondansetron (ZOFRAN) injection 4 mg  4 mg Intravenous Q6H PRN Hayder Finch III, MD       • oxyCODONE (ROXICODONE) 5 MG/5ML solution 5 mg  5 mg Per J Tube Q4H PRN Joan Moseley MD       • pravastatin (PRAVACHOL) tablet 20 mg  20 mg Per J Tube Daily Joan Moseley MD   20 mg at 05/02/22 0827   • [COMPLETED] sodium chloride 0.9 % bolus 500 mL  500 mL Intravenous Once Jim Tomas  mL/hr at 05/01/22 2101 500 mL at 05/01/22 2101   • sodium chloride 0.9 % flush 10 mL  10 mL Intravenous Q12H Hayder Finch III, MD   10 mL at 05/02/22 0828   • sodium chloride 0.9 % flush 10 mL  10 mL Intravenous PRN Hayder Finch III, MD   10 mL at 05/01/22 2030   • sodium chloride 0.9 % infusion  75 mL/hr Intravenous Continuous Hayder Finch III, MD 75 mL/hr at 05/02/22 0454 75 mL/hr at 05/02/22 0454     Orders (last 7 days)      Start     Ordered    05/02/22 1139  Access Port  Continuous        Comments: Access for left chest port    05/02/22 1142    05/02/22 1106  XR Chest 1 View  1 Time Imaging         05/02/22 1105    05/02/22 0830  ECG 12 Lead  STAT         05/02/22 0830    05/02/22 0600  TSH  Morning Draw         05/01/22 1043    05/01/22 2145  sodium chloride 0.9 % bolus 500 mL  Once         05/01/22 2050 05/01/22 2145  digoxin (LANOXIN) injection 250 mcg  Once         05/01/22 2050 05/01/22 2145  metoprolol tartrate (LOPRESSOR) tablet 12.5 mg  Once         05/01/22 2050 05/01/22 2100  metoprolol tartrate (LOPRESSOR) tablet 12.5 mg  Every 12 Hours Scheduled,   Status:  Discontinued         05/01/22 0905 05/01/22 2015  metoprolol tartrate (LOPRESSOR) injection 5 mg  Once         05/01/22 0349     05/01/22 1920  Inpatient Cardiology Consult  Once        Specialty:  Cardiology  Provider:  Jim Tomas MD    05/01/22 1920    05/01/22 1600  Gabapentin (NEURONTIN) 50 mg/mL solution 300 mg  3 Times Daily         05/01/22 1459    05/01/22 1501  oxyCODONE (ROXICODONE) 5 MG/5ML solution 5 mg  Every 4 Hours PRN         05/01/22 1502    05/01/22 1100  metoprolol tartrate (LOPRESSOR) tablet 12.5 mg  Every 12 Hours Scheduled         05/01/22 1014    05/01/22 1044  T4, Free  Once         05/01/22 1043    05/01/22 1000  digoxin (LANOXIN) injection 250 mcg  Once         05/01/22 0905    05/01/22 0815  HYDROmorphone (DILAUDID) injection 1 mg  Every 2 Hours PRN         05/01/22 0811    05/01/22 0600  XR Chest 1 View  1 Time Imaging         04/30/22 0823    05/01/22 0600  CBC (No Diff)  Morning Draw         04/30/22 0823    05/01/22 0600  Basic Metabolic Panel  Morning Draw         04/30/22 0823    04/30/22 1800  Enoxaparin Sodium (LOVENOX) syringe 40 mg  Daily         04/29/22 1548    04/30/22 1105  Inpatient Pulmonology Consult  Once        Specialty:  Pulmonary Disease  Provider:  Hugo Mireles MD    04/30/22 1104    04/30/22 1104  Flush ngt with 10cc of saline q8 hours to keep functional.  Nursing Communication  Once        Comments: Flush ngt with 10cc of saline q8 hours to keep functional.    04/30/22 1104    04/30/22 1056  Daily Weights  Daily       04/30/22 1055    04/30/22 1054  Administer Tube Feeding Via Feeding Tube Already in Place  Continuous         04/30/22 1055    04/30/22 1054  Write Hang Time on Enteral Feeding Bag  Per Order Details        Comments: Enteral Nutrition Bag May Hang For 24 Hours.  If Cans in Bags, Hang Time Limited to 4 Hours.    04/30/22 1055    04/30/22 1054  Notify Provider - Abdominal Discomfort, Pain or Distention, No Bowel Movement in 3 Days  Until Discontinued         04/30/22 1055    04/30/22 1054  Impact Peptide 1.5 (Pivot 1.5); Tube Feeding Type: Continuous; Continuous  Tube Feeding Start Rate (mL/hr): 10; Then Advance Rate By (mL/hr): Do Not Advance; Every __ Hours: Patient at Goal Rate; To Goal Rate of (mL/hr): RD to Manage  Diet Effective Now        Comments: ADVANCE ONLY PER THORACIC SURGERY'S ORDERS    04/30/22 1055    04/30/22 0930  methIMAzole (TAPAZOLE) tablet 10 mg  Daily         04/30/22 0823    04/30/22 0930  pravastatin (PRAVACHOL) tablet 20 mg  Daily         04/30/22 0823    04/30/22 0930  metoprolol tartrate (LOPRESSOR) tablet 25 mg  Every 12 Hours Scheduled,   Status:  Discontinued         04/30/22 0823    04/30/22 0823  oxyCODONE (ROXICODONE) immediate release tablet 5 mg  Every 4 Hours PRN,   Status:  Discontinued         04/30/22 0824    04/30/22 0800  Ambulate Patient  3 Times Daily         04/29/22 1548    04/30/22 0600  Basic Metabolic Panel  Morning Draw         04/29/22 1548    04/30/22 0600  CBC & Differential  Morning Draw         04/29/22 1548    04/30/22 0600  XR Chest 1 View  1 Time Imaging         04/29/22 1548    04/30/22 0600  CBC Auto Differential  PROCEDURE ONCE         04/29/22 2205    04/30/22 0000  Consult to Nutritional Services  Once        Comments: Start trickle feeds and advance slowly   Provider:  (Not yet assigned)    04/29/22 1548    04/29/22 2100  sodium chloride 0.9 % flush 10 mL  Every 12 Hours Scheduled         04/29/22 1548    04/29/22 1900  ceFAZolin in dextrose (ANCEF) IVPB solution 2 g  Every 8 Hours         04/29/22 1548    04/29/22 1800  Up in Chair  3 Times Daily         04/29/22 1548    04/29/22 1737  POC Glucose Once  PROCEDURE ONCE         04/29/22 1609    04/29/22 1700  phenylephrine (ANDIE-SYNEPHRINE) 50 mg in 250 mL NS infusion  Titrated,   Status:  Discontinued         04/29/22 1604    04/29/22 1605  Assess Need for Indwelling Urinary Catheter - Follow Removal Protocol  Continuous        Comments: Indwelling Urinary Catheter Removal Criteria  Discontinue Indwelling Urinary Catheter Unless One of the Following is  Present:  Urinary Retention or Obstruction  Chronic Urinary Catheter Use  End of Life  Critical Illness with Strict I/O   Tract or Abdominal Surgery  Stage 3/4 Sacral / Perineal Wound  Required Activity Restriction: Trauma  Required Activity Restriction: Spine Surgery  If Patient is Being Followed by Urology Contact Them PRIOR to Removal  Do Not Remove Indwelling Urinary Catheter er Order is Present with a CLINICAL REASON to Maintain the Catheter. Provider is Required to Include a Clinical Reason to Maintain a Urinary Catheter    Patient Admitted With Indwelling Urinary Catheter (Not Placed at [facility])  Assess for Continued Need & Document Medical Necessity  If Infection is Suspected, Contact the Provider        04/29/22 1604    04/29/22 1605  Urinary Catheter Care  Every Shift       04/29/22 1604    04/29/22 1605  Start all home meds 4/30/2022.  Nursing Communication  Once        Comments: Start all home meds 4/30/2022.    04/29/22 1604    04/29/22 1605  Keep MAP > 65  Nursing Communication  Once        Comments: Keep MAP > 65    04/29/22 1604    04/29/22 1605  Do NOT remove mccann catheter  Nursing Communication  Once        Comments: Do NOT remove mccann catheter    04/29/22 1604    04/29/22 1605  Give all oral meds per J-tube.  Nursing Communication  Once        Comments: Give all oral meds per J-tube.    04/29/22 1604    04/29/22 1600  Strict Intake and Output  Every 2 Hours       04/29/22 1548    04/29/22 1600  Turn cough deep breathe  Every 4 Hours       04/29/22 1548    04/29/22 1600  acetaminophen (TYLENOL) tablet 1,000 mg  3 Times Daily         04/29/22 1548    04/29/22 1600  gabapentin (NEURONTIN) capsule 300 mg  3 Times Daily,   Status:  Discontinued         04/29/22 1548    04/29/22 1550  metoprolol succinate XL (TOPROL-XL) 24 hr tablet 50 mg  Daily,   Status:  Discontinued         04/29/22 1548    04/29/22 1550  methIMAzole (TAPAZOLE) tablet 10 mg  Daily,   Status:  Discontinued          "04/29/22 1548    04/29/22 1550  pravastatin (PRAVACHOL) tablet 20 mg  Daily,   Status:  Discontinued         04/29/22 1548    04/29/22 1550  sodium chloride 0.9 % infusion  Continuous         04/29/22 1548    04/29/22 1549  Continuous Cardiac Monitoring  Continuous         04/29/22 1548    04/29/22 1549  Telemetry - Maintain IV Access  Continuous,   Status:  Canceled         04/29/22 1548    04/29/22 1549  May Be Off Telemetry for Tests  Continuous         04/29/22 1548    04/29/22 1549  ACLS Protocol For Life Threatening Dysrhythmias (If No DNR Order)  Continuous         04/29/22 1548    04/29/22 1549  Notify Provider if ACLS Protocol Activated  Until Discontinued         04/29/22 1548    04/29/22 1549  Place sequential compression device  Once         04/29/22 1548    04/29/22 1549  NG tube to Low Wall Suction  Until Discontinued        Comments: DO NOT MANIPULATE    04/29/22 1548    04/29/22 1549  Incentive Spirometry  Every Hour While Awake       04/29/22 1548    04/29/22 1549  NPO Diet NPO Type: Strict NPO  Diet Effective Now         04/29/22 1548    04/29/22 1549  PT Consult: Eval & Treat Full  Once         04/29/22 1548    04/29/22 1549  Insert Peripheral IV  Once         04/29/22 1548    04/29/22 1549  Saline Lock & Maintain IV Access  Continuous         04/29/22 1548    04/29/22 1549  Code Status and Medical Interventions:  Continuous         04/29/22 1548    04/29/22 1549  Start normal saline at 10 cc/h through the jejunostomy tube  Nursing Communication  Once        Comments: Start normal saline at 10 cc/h through the jejunostomy tube    04/29/22 1548    04/29/22 1548  ondansetron (ZOFRAN) tablet 4 mg  Every 6 Hours PRN        \"Or\" Linked Group Details    04/29/22 1548    04/29/22 1548  ondansetron (ZOFRAN) injection 4 mg  Every 6 Hours PRN        \"Or\" Linked Group Details    04/29/22 1548    04/29/22 1548  oxyCODONE (ROXICODONE) immediate release tablet 5 mg  Every 4 Hours PRN,   Status:  Discontinued    "      04/29/22 1548    04/29/22 1548  HYDROmorphone (DILAUDID) injection 1 mg  Every 4 Hours PRN,   Status:  Discontinued         04/29/22 1548    04/29/22 1548  LORazepam (ATIVAN) 2 MG/ML concentrated solution 1 mg  Every 8 Hours PRN         04/29/22 1548    04/29/22 1548  nitroglycerin (NITROSTAT) SL tablet 0.4 mg  Every 5 Minutes PRN         04/29/22 1548    04/29/22 1548  sodium chloride 0.9 % flush 10 mL  As Needed         04/29/22 1548    04/29/22 1428  Give all meds per J-tube.  Nursing Communication  Once,   Status:  Canceled        Comments: Give all meds per J-tube.    04/29/22 1427    04/29/22 1344  ketorolac (TORADOL) injection 15 mg  Every 6 Hours PRN         04/29/22 1344    04/29/22 1335  ECG 12 Lead  STAT         04/29/22 1334    04/29/22 1334  XR Chest Post CVA Port  1 Time Imaging         04/29/22 1340    04/29/22 1329  Notify physician (specify)  Until Discontinued        Comments: Notify MD for chest tube output >200 cc/hour for 4 hours or if > 1000 cc in 4 hours.   If urine output is <30 cc/hour or <120 cc in 4 hours.      04/29/22 1340    04/29/22 1329  Vital Signs  Per Hospital Policy        Comments: Vital signs every 15 minutes X4, every 30 minutes X4, then every hour.      04/29/22 1340    04/29/22 1239  Vital Signs Every 5 Minutes for 15 Minutes, Every 15 Minutes Thereafter.  Continuous,   Status:  Canceled         04/29/22 1238    04/29/22 1239  Call Anesthesiologist For Additional IV Fluid Bolus For Hypotension / Tachycardia  Continuous,   Status:  Canceled         04/29/22 1238    04/29/22 1239  Notify Anesthesia of Any Acute Changes in Patient Condition  Until Discontinued,   Status:  Canceled         04/29/22 1238    04/29/22 1239  Notify Anesthesia for Unrelieved Pain  Until Discontinued,   Status:  Canceled         04/29/22 1238    04/29/22 1239  If Respirations are Less Than 8, See Narcan Order & Notify Anesthesia STAT  Continuous,   Status:  Canceled         04/29/22 1235     "04/29/22 1239  Once Discharge Criteria to Floor Met, Follow Surgeon Orders  Continuous,   Status:  Canceled         04/29/22 1238    04/29/22 1239  Discharge Patient From PACU When Discharge Criteria Met  Continuous,   Status:  Canceled         04/29/22 1238    04/29/22 1238  diphenhydrAMINE (BENADRYL) capsule 25 mg  Every 4 Hours PRN,   Status:  Discontinued        \"Or\" Linked Group Details    04/29/22 1238    04/29/22 1238  diphenhydrAMINE (BENADRYL) injection 25 mg  Every 4 Hours PRN,   Status:  Discontinued        \"Or\" Linked Group Details    04/29/22 1238    04/29/22 1238  diphenhydrAMINE (BENADRYL) injection 25 mg  Every 4 Hours PRN,   Status:  Discontinued        \"Or\" Linked Group Details    04/29/22 1238    04/29/22 1238  ondansetron (ZOFRAN) injection 4 mg  Once As Needed,   Status:  Discontinued         04/29/22 1238    04/29/22 1238  metoclopramide (REGLAN) injection 10 mg  Once As Needed,   Status:  Discontinued         04/29/22 1238    04/29/22 1238  oxyCODONE (ROXICODONE) immediate release tablet 5 mg  Once As Needed,   Status:  Discontinued         04/29/22 1238    04/29/22 1238  HYDROmorphone (DILAUDID) injection 0.25 mg  Every 30 Minutes PRN,   Status:  Discontinued         04/29/22 1238    04/29/22 1238  naloxone (NARCAN) injection 0.4 mg  Every 5 Minutes PRN,   Status:  Discontinued         04/29/22 1238    04/29/22 1238  Oxygen Therapy- Nasal Cannula; Titrate for SPO2: per policy  Continuous         04/29/22 1237    04/29/22 1238  Pulse Oximetry, Continuous  Continuous         04/29/22 1237    04/29/22 0944  Tissue Pathology Exam  RELEASE UPON ORDERING         04/29/22 0944    04/29/22 0918  onabotulinumtoxina (BOTOX) injection  As Needed,   Status:  Discontinued         04/29/22 0918    04/29/22 0917  sodium chloride (NS) irrigation solution  As Needed,   Status:  Discontinued         04/29/22 0917    04/29/22 0917  bupivacaine (PF) (MARCAINE) 0.25 % injection  As Needed,   Status:  Discontinued "         04/29/22 0917    04/29/22 0900  sodium chloride 0.9 % flush 3 mL  Every 12 Hours Scheduled,   Status:  Discontinued         04/29/22 0545    04/29/22 0900  sodium chloride 0.9 % flush 3 mL  Every 12 Hours Scheduled,   Status:  Discontinued         04/29/22 0639    04/29/22 0728  Insert Indwelling Urinary Catheter  Once,   Status:  Canceled         04/29/22 0920    04/29/22 0646  fentaNYL citrate (PF) (SUBLIMAZE) injection  Code / Trauma / Sedation Medication         04/29/22 0646    04/29/22 0645  midazolam (VERSED) injection  Code / Trauma / Sedation Medication         04/29/22 0646    04/29/22 0641  lactated ringers infusion  Continuous         04/29/22 0639    04/29/22 0641  famotidine (PEPCID) injection 20 mg  Once         04/29/22 0639    04/29/22 0640  Insert Peripheral IV  Once,   Status:  Canceled         04/29/22 0639    04/29/22 0640  Saline Lock & Maintain IV Access  Continuous,   Status:  Canceled         04/29/22 0639    04/29/22 0640  May take Beta Blocker from home with sip of water.  Once,   Status:  Canceled        Comments: Only applicable to patients on home Beta Blocker    04/29/22 0639    04/29/22 0640  Pulse Oximetry, Continuous  Continuous,   Status:  Canceled         04/29/22 0639    04/29/22 0640  POC Glucose Once  Once,   Status:  Canceled        Comments: For all diabetic patients. Notify Anesthesiologist for blood sugar greater than 180 or less than 60..      04/29/22 0639    04/29/22 0639  Vital Signs - Per Anesthesia Protocol  As Needed,   Status:  Canceled       04/29/22 0639    04/29/22 0639  Oxygen Therapy- Nasal Cannula; Titrate for SPO2: Per Policy  Continuous PRN,   Status:  Canceled       04/29/22 0639    04/29/22 0639  Pulse Oximetry, Continuous  Continuous PRN,   Status:  Canceled       04/29/22 0639    04/29/22 0639  POC Glucose PRN  As Needed,   Status:  Canceled      Comments: For all diabetic patients. Notify Anesthesiologist for blood sugar > 180.      04/29/22  0639    04/29/22 0639  sodium chloride 0.9 % flush 3-10 mL  As Needed,   Status:  Discontinued         04/29/22 0639 04/29/22 0639  lidocaine PF 1% (XYLOCAINE) injection 0.5 mL  Once As Needed,   Status:  Discontinued         04/29/22 0639 04/29/22 0639  fentaNYL citrate (PF) (SUBLIMAZE) injection 50 mcg  Every 10 Minutes PRN,   Status:  Discontinued         04/29/22 0639 04/29/22 0639  midazolam (VERSED) injection 1 mg  Every 10 Minutes PRN,   Status:  Discontinued         04/29/22 0639    04/29/22 0547  acetaminophen (TYLENOL) tablet 1,000 mg  Once,   Status:  Discontinued         04/29/22 0545 04/29/22 0547  celecoxib (CeleBREX) capsule 200 mg  Once,   Status:  Discontinued         04/29/22 0545 04/29/22 0547  gabapentin (NEURONTIN) capsule 600 mg  Once,   Status:  Discontinued         04/29/22 0545    04/29/22 0547  heparin (porcine) 5000 UNIT/ML injection 5,000 Units  Once         04/29/22 0545    04/29/22 0547  ceFAZolin in dextrose (ANCEF) IVPB solution 2 g  Once         04/29/22 0545    04/29/22 0546  Follow Anesthesia Guidelines / Standing Orders  Once,   Status:  Canceled         04/29/22 0545    04/29/22 0546  Patient to Drink Gatorade 20oz 2 Hours Prior to Surgery  Once,   Status:  Canceled         04/29/22 0545    04/29/22 0546  Diabetic Patient to Drink Gatorade G2 20oz 2 Hours Prior to Surgery  Once,   Status:  Canceled         04/29/22 0545    04/29/22 0546  Insert Peripheral IV  Once,   Status:  Canceled        Comments: Please place two IVs on all DaVinci cases    04/29/22 0545    04/29/22 0546  Saline Lock & Maintain IV Access  Continuous,   Status:  Canceled         04/29/22 0545    04/29/22 0546  Inpatient Admission  Once         04/29/22 0545    04/29/22 0545  sodium chloride 0.9 % flush 3-10 mL  As Needed,   Status:  Discontinued         04/29/22 0545    Unscheduled  Telemetry - Pulse Oximetry  Continuous PRN      Comments: If Patient Develops Unresponsiveness, Acute Dyspnea,  Cyanosis or Suspected Hypoxemia Start Continuous Pulse Ox Monitoring, Apply Oxygen & Notify Provider    04/29/22 1548    Unscheduled  Oxygen Therapy- Nasal Cannula; Titrate for SPO2: equal to or greater than, 90%  Continuous PRN      Comments: If Patient Develops Unresponsiveness, Acute Dyspnea, Cyanosis or Suspected Hypoxemia Start Continuous Pulse Ox Monitoring, Apply Oxygen & Notify Provider    04/29/22 1548    Unscheduled  ECG 12 Lead  As Needed      Comments: Nurse to Release if Patient Experiences Acute Chest Pain or Dystrythmias    04/29/22 1548    Unscheduled  Potassium  As Needed      Comments: For Sudden Ventricular Dysrhythmias      04/29/22 1548    Unscheduled  Magnesium  As Needed      Comments: For Sudden Ventricular Dysrhythmias      04/29/22 1548    Unscheduled  Digoxin Level  As Needed      Comments: Patient With Atrial Dysrhythmias      04/29/22 1548    Unscheduled  Blood Gas, Arterial -  As Needed      Comments: Per O2 policy.      04/29/22 1548    Unscheduled  Sadi and Document Tube Depth (in cm)  As Needed       04/30/22 1055    Unscheduled  Oral Care  As Needed       04/30/22 1055    Unscheduled  Verify Tube Placement Initially & As Needed  As Needed       04/30/22 1055    Unscheduled  Flush Feeding Tube With 30-50mL Water As Needed  As Needed       04/30/22 1055    --  SCANNED - TELEMETRY           04/29/22 0000    --  SCANNED - TELEMETRY           04/29/22 0000    --  SCANNED - TELEMETRY           04/29/22 0000    --  SCANNED - TELEMETRY           04/29/22 0000    --  SCANNED - TELEMETRY           04/29/22 0000    --  SCANNED - TELEMETRY           04/29/22 0000                   Operative/Procedure Notes (last 7 days)      Hayder Finch III, MD at 04/29/22 0824          THORASCOPIC ESOPHAGOGASTRECTOMY WITH DAVINCI ROBOT  Progress Note    Maya Ribera  4/29/2022    Pre-op Diagnosis:   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]       Post-Op Diagnosis Codes:     *  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]    Procedure/CPT® Codes:        Procedure(s):  BRONCHOSCOPY   RIGHT VAT WITH TOTAL DECORTICATION RIGHT LUNG   ROBOT ASSISTED TOTAL ESOPHAGECTOMY   INTERCOSTAL NERVE BLOCK   JEJUNOSTOMY TUBE REPLACEMENT    Surgeon(s):  Hayder Finch III, MD Mahan, Angela L, MD    Anesthesia: General with Block    Staff:   Circulator: Kalie Castle RN; Kael Lamar RN  Scrub Person: Asha Hahn PCT; Charline Ortiz  Assistant: Pati Rebolledo CRNFA  Assistant: Pati Rebolledo CRNFA      Estimated Blood Loss: 100 mL    Urine Voided: 150 mL    Specimens:                Specimens     ID Source Type Tests Collected By Collected At Frozen?    A Lymph Node Lymph Node · TISSUE PATHOLOGY EXAM   Hayder Ficnh III, MD 4/29/22 0944 No    Description: LEVEL 7    B Esophagus Tissue · TISSUE PATHOLOGY EXAM   Hayder Finch III, MD 4/29/22 1203 No    Description: ESOPHAGUS AND GE JUNCTION; FRESH FOR PERMANENT    C Esophagus Tissue · TISSUE PATHOLOGY EXAM   Hayder Finch III, MD 4/29/22 1207 No    Description: FINAL ESOPHAGEAL MARGIN    D Stomach Tissue · TISSUE PATHOLOGY EXAM   Hayder Finch III, MD 4/29/22 1226 No    Description: FINAL GASTRIC MARGIN                Drains:   Chest Tube 1 Right (Active)       Gastrostomy/Enterostomy Jejunostomy 1 18 Fr. LUQ (Active)       Urethral Catheter Silicone;Straight-tip;Double-lumen 16 Fr. (Active)       [REMOVED] Gastrostomy/Enterostomy Jejunostomy 1 20 Fr. LLQ (Removed)   Surrounding Skin Unable to view 04/29/22 0628   Securement taped to abdomen 04/29/22 0628   Drain Status Clamped 04/29/22 0628   Drainage Appearance None 04/29/22 0628   Site Description Unable to view 04/29/22 0628   Dressing Status Clean;Dry;Intact 04/29/22 0628       Findings: Right pleural space almost completely obliterated.  Lungs required decortication and mobilization so that esophagus could be mobilized.  Benign appearing lymph  nodes removed from the subcarinal space.  No evidence for metastatic disease in the perigastric lymph nodes or in the liver.  Jejunostomy tube was removed to allow for exposure to do the surgery.  Replaced at the end of the procedure.        Complications: none    Assistant: Pati Rebolledo CRNFA  was responsible for performing the following activities: Retraction, Suction, Irrigation, Suturing, Closing, Placing Dressing and Held/Positioned Camera  and their skilled assistance was necessary for the success of this case.    Summary of procedure: Ms. Ribera was brought to the operating room and placed on the operating table in the supine position.  Following the induction of adequate general endotracheal anesthesia the flexible bronchoscope was passed down the endotracheal tube.  Distal trachea and karin appeared normal.  The bronchoscope was then passed on the right mainstem bronchus.  Right upper lobe right middle lobe and right lower lobe bronchi and their segmental bronchi showed no endobronchial lesions or mucosal abnormalities.  Bronchoscope was then passed down the left main bronchus.  Left upper lobe and left lower lobe bronchi showed no gross endobronchial lesions or mucosal abnormalities.  The bronchoscope was then used to position the double-lumen tube in the left main bronchus.  Once the endotracheal tube was in good position nasogastric tube was passed without difficulty.    Patient was now turned into the left lateral decubitus position.  All pressure points were padded.  A roll was placed in the left axilla.  Patient was secured to the operating table with 3 inch adhesive tape and Velcro safety strap.  Right chest was now prepped and draped in usual sterile manner.  Port site was created in the eighth intercostal space mid axillary line.  Pleural space was almost completely obliterated due to adhesions and scarring between the lung and the chest wall.  Blunt dissection was used to push the lung  away from the chest wall enough that 4 other port sites could created.  The da Demarcus robot was brought into the operative field and docked to the ports.  Instruments were passed into the pleural space under direct vision.  At this point I broke scrub and went to the robot console to perform the surgery.  Dr Moseley in our assistant stated the bedside to pass and manipulate instruments.  Began by taking down all adhesions from the lung and to the chest wall and mediastinum.  Lung was decorticated and completely mobilized thus exposing the posterior mediastinum.  Once the lung was mobilized and retracted out of the way the pleura overlying the esophagus was incised and dissected away from the esophagus.  The esophagus was mobilized at the level of the inferior pulmonary vein.  The esophagus was brought up out of the mediastinum and looped with a Penrose drain.    Dissection was carried out all around the esophagus cephalad.  Using the vessel sealer the esophagus was completely mobilized out of the mediastinum.  As we approached the azygos vein this vein was mobilized and then divided with the vascular stapler.  We continued our dissection on up to the thoracic inlet.  Care was taken to peel the esophagus off the posterior tracheal wall.  Once the esophagus was mobilized all the way to the thoracic inlet we turned our attention to the subcarinal space.  Here a number of lymph nodes were removed from the subcarinal space and sent for permanent section.  We now continued our dissection inferiorly mobilizing the esophagus all the way down to the hiatus.  Once the esophagus was completely mobilized and the lymph nodes were removed from the subcarinal space hemostasis was obtained.  Intercostal nerve blocks were now performed with 0.25% Marcaine.  At this point instruments were removed under direct vision.  Robot was undocked from the ports.  A #28 Mozambican chest tube was passed through the camera port site and directed the  apex of the chest.  The tube was secured to the chest wall with a suture 0 silk.  Sponge instrument needle counts were correct.  All port sites were closed in layers with 2-0 Vicryl and the skin with 4-0 Vicryl.  Dry sterile dressings were applied.    Patient was now turned back into the supine position.  A roll was placed behind the shoulders to extend the neck.  The head was turned to the right.  Right neck chest and abdomen were now prepped and draped in usual sterile manner.  Patient's arms were tucked at the sides and were positioned and padded to prevent injury.  The feeding jejunostomy tube which had been in place for some time was removed.  This allowed for better mobilization.  Using a Veress needle CO2 was insufflated into the abdomen.  Once the abdomen was sufficiently full of CO2 a port site was created and the camera port was passed into the peritoneal space under direct vision.  4 other port sites were now created.  The Tee liver retractor was put into the abdomen and positioned to retract the left lobe of the liver away from the hiatus.  Da Demarcus robot was brought back into the operative field and docked to the ports.  Instruments were passed into the pleural space under direct vision.    Lesser curvature was mobilized exposing the right katelin.  Some adhesions between the esophagus and stomach and the katelin were taken down.  The hiatus was opened and the esophagus and upper stomach were completely mobilized and looped with a Penrose drain.  Dissection was carried around onto the left katelin mobilizing the stomach away from the diaphragm.  The lesser sac was now entered.  The gastroepiploic artery was clearly visualized and protected.  Short gastric vessels were taken down with the vessel sealer and the greater curvature was completely mobilized.    At this point instruments were removed from the abdomen.  Robot was undocked.  Ports were removed.  A small upper midline abdominal incision was made and  the peritoneal cavity was entered.  Self-retaining retractor was put into place.  Stomach was further mobilized and the left gastric artery was identified.  The left gastric pedicle was divided with stapler.  Stomach was now completely mobilized.  Pylorus was identified and injected with 4 cc of Botox.    We now turned our attention to the neck.  An incision was made along the medial border of the sternocleidomastoid muscle.  This was carried down through the skin and subcutaneous tissues and through the platysma muscle.  Sternocleidomastoid muscle was retracted laterally and dissection was carried down to the carotid sheath.  Carotid artery and internal jugular vein were retracted laterally.  The esophagus was exposed and delivered up out of the mediastinum into the neck.  The esophagus was transected in the neck using the stapler and fixing a Penrose drain to the esophagus.  The esophagus was now pulled down into the abdomen and delivered up out of the abdominal wound.  Care was taken to keep the Penrose drain from twisting.    Stomach was now completely up out of the peritoneal cavity.  A site on the lesser curvature was identified approximately 3 finger breaths above the pylorus.  Lesser curvature omentum was divided.  A gastric conduit measuring now 4 cm in diameter was now created thus amputating the proximal stomach and GE junction.  Esophagus and stomach were now sent to pathology.  The fundus of the stomach was sutured to the Penrose drain.  The stomach was carefully pulled up through the hiatus and into the mediastinum and up into the neck.  Great care was taken to keep him from twisting at the conduit.  There was obvious good perfusion of the conduit and no evidence of twisting of the conduit.  Anastomosis was now created stapling the posterior wall.  Nasogastric tube was now positioned in the stomach.  Anterior portion of the anastomosis was handsewn using running 4-0 PDS.  Tip of the gastric conduit was  amputated with the stapler.  Anastomosis was dropped back down into the upper mediastinum.    Jejunostomy tube was replaced and care was taken to position the tube in the jejunum properly.  Silk pursestring was placed around the site and is sutured to the tube.  Hemostasis was obtained.  Midline fascia was closed using internal retention sutures of #2 Vicryl and closing the midline fascia with a running 0 Prolene.  Subcutaneous tissues were closed with running 2-0 Vicryl.  Skin was closed with a running subcuticular 4-0 Vicryl.  The neck was closed simultaneously reapproximating the platysma with running 2-0 Vicryl and the skin with a running subcuticular 4-0 Vicryl.  All port sites in the abdomen were closed with 4-0 Vicryl.  Dry sterile dressings were applied.    Patient was now awakened in the operating room and transported to the recovery room in satisfactory condition having tolerated procedure well.  Sponge instrument and needle counts were correct both for the chest and the abdomen.    .        Dictated utilizing Dragon dictation    Hayder Finch III, MD     Date: 2022  Time: 13:40 EDT        Electronically signed by Hayder Finch III, MD at 22 1403          Physician Progress Notes (last 7 days)      Marquise Antony MD at 22 1010                 [facility]  [phone]  Dr. Marquise Antony    Subjective:  LOS: 2    Chief Complaint:  Post esophagectomy    Still with pain. Went into afib this morning briefly. Denies new soa.    Objective   Vital Signs past 24hrs    Temp range: Temp (24hrs), Av °F (36.7 °C), Min:98 °F (36.7 °C), Max:98 °F (36.7 °C)    BP range: BP: ()/(39-69) 131/56  Pulse range: Heart Rate:  [] 107  Resp rate range:      Device (Oxygen Therapy): nasal cannulaFlow (L/min):  [2] 2  Oxygen range:SpO2:  [89 %-98 %] 90 %      53.3 kg (117 lb 8.1 oz); Body mass index is 20.16 kg/m².    Intake/Output Summary (Last 24 hours) at 2022 1020  Last data  filed at 5/1/2022 0801  Gross per 24 hour   Intake 2076 ml   Output 790 ml   Net 1286 ml       Physical Exam  Eyes:      Pupils: Pupils are equal, round, and reactive to light.   Cardiovascular:      Rate and Rhythm: Normal rate and regular rhythm.      Heart sounds: No murmur heard.  Pulmonary:      Breath sounds: Decreased breath sounds and rhonchi (few scattered) present.      Comments: Right chest tube  Abdominal:      General: Bowel sounds are normal.      Palpations: Abdomen is soft. There is no mass.      Tenderness: There is no abdominal tenderness.   Musculoskeletal:         General: No swelling.   Neurological:      Mental Status: She is alert.       Results Review:    I have reviewed the laboratory and imaging data since the last note by [facility] physician.  My annotations are noted in assessment and plan.    Medication Review:  I have reviewed the current MAR.  My annotations are noted in assessment and plan.    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 fib on Eliquis      Plan of Treatment    Post esophagectomy - CxR noted. Still right ptx. Having pain. Increased dilaudid to q2hrs.    Brief afib, better after Dig. Decreased metoprolol to 12.5 mg bid due to lower bp.    Hyperthyroidism on Tapazole.    Now on duonebs to help airway clearance. Advised to use IS.    Needs to quit smoking.    Spoke to partner.    Marquise Antony MD  05/01/22  10:20 EDT    While in the room and during my examination of the patient I wore gloves, gown, mask, eye protection and followed enhanced droplet/contact isolation protocol and precautions.  I washed my hands before and after this patient encounter.    Part of this note may be  "an electronic transcription/translation of spoken language to printed text using the Dragon Dictation System.      Electronically signed by Marquise Antony MD at 05/01/22 1043     Joan Moseley MD at 04/30/22 1100          POST-OPERATIVE NOTE     Chief Complaint: esophageal cancer  S/P: RIGHT VAT WITH TOTAL DECORTICATION RIGHT LUNG   ROBOT ASSISTED TOTAL ESOPHAGECTOMY   INTERCOSTAL NERVE BLOCK   JEJUNOSTOMY TUBE REPLACEMENT  POD # 1    Subjective  Doing well this morning.  No complaints.  Objective:  General Appearance:  Comfortable and well-appearing.    Vital signs: (most recent): Blood pressure 101/42, pulse 66, temperature 97.5 °F (36.4 °C), temperature source Oral, resp. rate 14, height 162.6 cm (64.02\"), weight 53.3 kg (117 lb 8.1 oz), SpO2 94 %.  Vital signs are normal.    Lungs:  Normal effort.  There are decreased breath sounds.    Abdomen: Abdomen is soft.    Neurological: Patient is alert and oriented to person, place and time.    Skin:  Warm and dry.              Chest tube:   Site: Right, Clean, Dry and Intact  Suction: waterseal  Air Leak: negative  24 Hour Total: 350    Results Review:     I reviewed the patient's new clinical results.  I reviewed the patient's new imaging results and agree with the interpretation.  I reviewed the patient's other test results and agree with the interpretation    Assessment/Plan   Ms. Ribera is a pleasant 63-year-old lady postoperative day #1 status post robotic assisted decortication and esophagectomy.  She is doing well.    She will need to ambulate today and be out of bed to chair for most of the day.  This was discussed with the patient and nursing staff.    Her pain is well controlled with IV pain medication, we will plan to add ERAS protocol medications per J-tube.  We will continue 10 cc of saline per hour for today.  We will plan to restart tube feedings tomorrow.  Her NG tube has had minimal output, was flushed at bedside today and is now functional.    Her " chest tube is to waterseal without significant air leak, both NG tube and chest tube will remain for 5 days postoperatively.    She will continue n.p.o. for 14 days postoperatively.  This was discussed with the patient at length.  She can have swabs.    Joan Moseley MD  [facility]  04/30/22  11:00 EDT    Patient was seen and assessed while wearing personal protective equipment including facemask, protective eyewear and gloves.  Hand hygiene performed prior to entering the room and upon exiting with doffing of gloves.         Electronically signed by Joan Moseley MD at 04/30/22 1107          Consult Notes (last 7 days)      Marquise Antony MD at 04/30/22 1351      Consult Orders    1. Inpatient Pulmonology Consult [[id number]] ordered by Joan Moseley MD at 04/30/22 1105                   [facility]  [phone]  Dr. Marquise Antony      Subjective   LOS: 1 day     63-year-old female with esophageal cancer now status post esophagectomy.  She is still having some pain.  Not yet moving her bowels or passing flatus.  States she quit smoking yesterday.  History A. fib after some problems with her thyroid for what sounds like hyperthyroidism.  She has been on Eliquis for same.  She denies diagnosis of COPD.  Not on inhalers or oxygen at home.  Has some mitral valve prolapse but no other cardiac illness.  Denies kidney problems or diabetes.    Maya Ribera  reports previous alcohol use.,  reports that she has been smoking cigarettes. She started smoking about 41 years ago. She has a 40.00 pack-year smoking history. She has never used smokeless tobacco.  Objective   Past Hx:  has a past medical history of A-fib (HCC), Boil, breast (12/13/2021), Disease of thyroid gland, Esophageal cancer (HCC), Gastric ulcer, GERD (gastroesophageal reflux disease), Hyperlipidemia, Hypertension, Irritable bowel, Mitral valve prolapse, Trouble swallowing, and Uses feeding tube.  Surg Hx:  has a past  surgical history that includes Cholecystectomy; External ear surgery; Knee surgery (Left); Replacement total knee (Left); Upper endoscopic ultrasound w/ FNA (N/A, 12/7/2021); Colonoscopy (08/27/2021); Venous Access Device (Port) (Left, 12/15/2021); and Jejunostomy (N/A, 12/15/2021).  FH: family history includes Alcohol abuse in her brother; Asthma in her brother; Bipolar disorder in her brother and father; Breast cancer in her maternal grandmother, mother, and sister; COPD in her sister; Diabetes in her brother, father, maternal grandfather, maternal grandmother, mother, paternal grandfather, paternal grandmother, sister, and sister; Hypertension in her father, sister, sister, and son; Kidney cancer in her sister; Seizures in her brother.  SH:  reports that she has been smoking cigarettes. She started smoking about 41 years ago. She has a 40.00 pack-year smoking history. She has never used smokeless tobacco. She reports previous alcohol use. She reports that she does not use drugs.    Medications Prior to Admission   Medication Sig Dispense Refill Last Dose   • Eliquis 5 MG tablet tablet Take 5 mg by mouth Every 12 (Twelve) Hours. LD 12/4  PT STATED TO HOLD 72 HOURS PRIOR TO SURGERY   4/25/2022 at 1930   • HYDROcodone-acetaminophen (HYCET) 7.5-325 MG/15ML solution Take 15 mL by mouth Every 6 (Six) Hours As Needed for Moderate Pain . 236 mL 0 Past Month at Unknown time   • lansoprazole (PREVACID SOLUTAB) 30 MG Tablet Delayed Release Dispersible disintegrating tablet TAKE ONE TABLET BY MOUTH TWICE A DAY (Patient taking differently: Take 30 mg by mouth 2 (Two) Times a Day.) 60 tablet 1 4/29/2022 at 0400   • methIMAzole (TAPAZOLE) 10 MG tablet Take 10 mg by mouth Daily. Take DOS   4/28/2022 at 2000   • metoprolol succinate XL (Toprol XL) 25 MG 24 hr tablet Take 2 tablets by mouth Daily. 60 tablet 2 4/29/2022 at 0400   • ondansetron ODT (Zofran ODT) 8 MG disintegrating tablet Place 1 tablet on the tongue Every 8 (Eight)  Hours As Needed for Nausea or Vomiting. 30 tablet 0 Past Week at Unknown time   • pravastatin (PRAVACHOL) 20 MG tablet Take 20 mg by mouth Daily.   4/28/2022 at 0900   • sucralfate (CARAFATE) 1 GM/10ML suspension Take 1 g by mouth 4 (Four) Times a Day.   4/28/2022 at 2300   • aluminum-magnesium hydroxide 200-200 MG/5ML suspension, diphenhydrAMINE 12.5 MG/5ML liquid, Lidocaine Viscous HCl 2 % solution, nystatin 100,000 unit/mL suspension Swish and swallow 10 mL 4 (Four) Times a Day After Meals & at Bedtime. 400 mL 2 More than a month at Unknown time   • fentaNYL (DURAGESIC) 12 MCG/HR Place 1 patch on the skin as directed by provider Every 72 (Seventy-Two) Hours. 10 patch 0 4/23/2022   • LORazepam (LORazepam Intensol) 2 MG/ML concentrated solution Take 0.5 mL by mouth Every 8 (Eight) Hours As Needed for Anxiety. Or nausea 30 mL 0 hasnt started taking yet   • nystatin in Lidocaine Viscous HCl solution-diphenhydrAMINE liquid-aluminum-magnesium hydroxide-simethicone suspension Swish and swallow 10 mL by mouth 4 times per day after meals and at bedtime. 400 mL 2 More than a month at Unknown time   • prochlorperazine (COMPAZINE) 10 MG tablet Take 1 tablet by mouth Every 6 (Six) Hours As Needed for Nausea. 30 tablet 2 hasnt' taken yet     Allergies   Allergen Reactions   • Codeine Hives     Patient states this is when she was very young.        Review of Systems   Constitutional: Negative for chills and fever.   HENT: Negative for congestion and sore throat.    Respiratory: Negative for cough, shortness of breath and wheezing.    Cardiovascular: Positive for chest pain (from surgery). Negative for leg swelling.   Gastrointestinal: Negative for abdominal pain, nausea and vomiting.   Genitourinary: Negative for dysuria and hematuria.   Musculoskeletal: Negative for arthralgias and back pain.   Skin: Negative for pallor and rash.   Neurological: Negative for seizures and headaches.   Psychiatric/Behavioral: Negative for  agitation and confusion.     Vital Signs past 24hrs  BP range: BP: ()/(41-65) 98/51  Pulse range: Heart Rate:  [59-73] 63  Resp rate range: Resp:  [14] 14  Temp range: Temp (24hrs), Av.8 °F (36.6 °C), Min:97.5 °F (36.4 °C), Max:98 °F (36.7 °C)    Oxygen range: SpO2:  [93 %-97 %] 93 %; Flow (L/min):  [2-4] 2;   Device (Oxygen Therapy): nasal cannula  53.3 kg (117 lb 8.1 oz); Body mass index is 20.16 kg/m².  I/O this shift:  In: 0   Out: 90 [Urine:50; Chest Tube:40]    -year-old female who is thin and almost cachectic.  Currently with nasogastric tube.  She has sutures of recent esophagectomy and right chest tube.  Oropharynx somewhat dry.  JVP difficult to assess but does not appear elevated.  Lungs reveal bilateral air entry with diminished breath sounds.  Scattered rhonchi and some conducted breath sounds.  No wheeze.  Percussion note deferred.  Heart examination S1-S2 present rhythm regular and no murmurs.  No edema lower extremities.  Abdomen is soft bowel sounds are absent.  J-tube is present.  No peripheral cyanosis clubbing.  Able to move all 4 extremities alert and awake but looks uncomfortable from post surgical discomfort.  No obvious adenopathy.    Results Review:    I have reviewed the laboratory and imaging data from current admission. My annotations are as noted in assessment and plan.    Medication Review:  I have reviewed the current MAR. My annotations are as noted in assessment and plan.    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 fib on Eliquis      Plan of Treatment    Patient is status post esophagectomy and having expected postop course.  Right chest tube management per thoracic surgery, small right sided ptx.    Encourage airway clearance and use of I-S. ? nebs    Home meds via J-tube.    Needs PFT's eventually.    On sc Lovenox and holding  13 yo full AC.    HR under control.    Spoke with patient's partner at bedside also.      Electronically signed by Marquise Antony MD, 22, 1:51 PM EDT.      Part of this note may be an electronic transcription/translation of spoken language to printed text using the Dragon Dictation System.      Electronically signed by Marquise Antony MD at 22 4255     Jasiel Nj MD at 22 1106      Consult Orders    1. Inpatient Cardiology Consult [[id number]] ordered by Joan oMseley MD at 22 1920               Date of Hospital Visit: 22  Encounter Provider: Jasiel Nj MD  Place of Service: [facility] CARDIOLOGY  Patient Name: Maya Ribera  :1958  Referral Provider: Hayder Finch III, *    Chief complaint esophageal cancer     History of Present Illness Maya Ribera is a 63 year old woman who follows with [facility]. She has a history of PAF (apixaban and metoprolol). She has esophageal cancer and has completed chemotherapy and radiation. She underwent esophagectomy on . She had an episode of atrial fibrillation yesterday, received IV digoxin, and has remained in NSR.    She is receiving medicines via J-tube. She is on VTE ppx with enoxaparin but no full dose anticoagulation. She is very tired and has some pain but has no cardiac complaints.     ECHO 21    The ejection fraction biplane was calculated at 54%. The left ventricular chamber size, wall thickness, and systolic function are within normal limits. There are no regional wall motion abnormalities observed. Mildly thickened mitral valve leaflets with mild Bileaflet prolapse.       There is no mitral stenosis .There is mild mitral regurgitation. Right ventricular systolic pressure of 30 mmHg. Mild tricuspid regurgitation. The aortic root is normal in size. The proximal ascending aorta measures 3.7 cm. Trace aortic regurgitation.     Past Medical History:   Diagnosis Date   •  Boil, breast 12/13/2021    HEALING UNDER LEFT BREAST    • Cervical cancer (HCC)    • Dysphagia    • Esophageal cancer (HCC)    • Esophagitis 9/21/2021   • Gastric ulcer    • GERD (gastroesophageal reflux disease)    • Hyperlipidemia    • Hypertension    • Hyperthyroidism    • Irritable bowel    • Mitral valve prolapse     FOLLOWED BY     • PAF (paroxysmal atrial fibrillation) (HCC)    • Uses feeding tube        Past Surgical History:   Procedure Laterality Date   • CHOLECYSTECTOMY     • COLONOSCOPY  08/27/2021    [facility]   • ESOPHAGOGASTRECTOMY Right 4/29/2022    Procedure: BRONCHOSCOPY, RIGHT THORACOSCOPY WITH DAVINCI ROBOT WITH TOTAL ESOPHAGECTOMY, INTERCOSTAL NERVE BLOCK, JEJUNOSTOMY TUBE REPLACEMENT;  Surgeon: Hayder Finch III, MD;  Location: [facility];  Service: Robotics - DaVinci;  Laterality: Right;   • EXTERNAL EAR SURGERY     • JEJUNOSTOMY N/A 12/15/2021    Procedure: open JEJUNOSTOMY tube placement;  Surgeon: Bhanu Hollis MD;  Location: [facility];  Service: General;  Laterality: N/A;   • KNEE SURGERY Left    • REPLACEMENT TOTAL KNEE Left    • UPPER ENDOSCOPIC ULTRASOUND W/ FNA N/A 12/7/2021    Procedure: Esophagogastroduodenoscopy with endoscopic ultrasound  WITH STAGING AND FINE NEEDLE BIOPSY;  Surgeon: Amrit Green MD;  Location: [facility] ENDOSCOPY;  Service: Gastroenterology;  Laterality: N/A;  post op: inlet patch, esophageal mass, T2   • VENOUS ACCESS DEVICE (PORT) INSERTION Left 12/15/2021    Procedure: INSERTION OF PORTACATH;  Surgeon: Bhanu Hollis MD;  Location: [facility];  Service: General;  Laterality: Left;       Prior to Admission medications    Medication Sig Start Date End Date Taking? Authorizing Provider   Eliquis 5 MG tablet tablet Take 5 mg by mouth Every 12 (Twelve) Hours. LD 12/4  PT STATED TO HOLD 72 HOURS PRIOR TO SURGERY 10/20/21  Yes Provider, MD Jaden   HYDROcodone-acetaminophen (HYCET) 7.5-325 MG/15ML solution Take 15 mL by  mouth Every 6 (Six) Hours As Needed for Moderate Pain . 3/24/22  Yes Iggy Harrell MD   lansoprazole (PREVACID SOLUTAB) 30 MG Tablet Delayed Release Dispersible disintegrating tablet TAKE ONE TABLET BY MOUTH TWICE A DAY  Patient taking differently: Take 30 mg by mouth 2 (Two) Times a Day. 4/6/22  Yes Iggy Harrell MD   methIMAzole (TAPAZOLE) 10 MG tablet Take 10 mg by mouth Daily. Take DOS 11/1/21  Yes Jaden Gaviria MD   metoprolol succinate XL (Toprol XL) 25 MG 24 hr tablet Take 2 tablets by mouth Daily. 4/4/22  Yes Barbra Mejia APRN   ondansetron ODT (Zofran ODT) 8 MG disintegrating tablet Place 1 tablet on the tongue Every 8 (Eight) Hours As Needed for Nausea or Vomiting. 3/24/22  Yes Iggy Harrell MD   pravastatin (PRAVACHOL) 20 MG tablet Take 20 mg by mouth Daily. 7/9/21  Yes Jaden Gaviria MD   sucralfate (CARAFATE) 1 GM/10ML suspension Take 1 g by mouth 4 (Four) Times a Day.   Yes Jaden Gaviria MD   aluminum-magnesium hydroxide 200-200 MG/5ML suspension, diphenhydrAMINE 12.5 MG/5ML liquid, Lidocaine Viscous HCl 2 % solution, nystatin 100,000 unit/mL suspension Swish and swallow 10 mL 4 (Four) Times a Day After Meals & at Bedtime. 1/24/22   Ciro Pelletier MD   fentaNYL (DURAGESIC) 12 MCG/HR Place 1 patch on the skin as directed by provider Every 72 (Seventy-Two) Hours. 2/21/22   Iggy Harrell MD   LORazepam (LORazepam Intensol) 2 MG/ML concentrated solution Take 0.5 mL by mouth Every 8 (Eight) Hours As Needed for Anxiety. Or nausea 4/21/22   Iggy Harrell MD   nystatin in Lidocaine Viscous HCl solution-diphenhydrAMINE liquid-aluminum-magnesium hydroxide-simethicone suspension Swish and swallow 10 mL by mouth 4 times per day after meals and at bedtime. 1/25/22   Ciro Pelletier MD   prochlorperazine (COMPAZINE) 10 MG tablet Take 1 tablet by mouth Every 6 (Six) Hours As Needed for Nausea. 2/10/22   Barbra Mejia, APRN       Social History  "    Socioeconomic History   • Marital status: Single   • Number of children: 1   • Years of education: High school   Tobacco Use   • Smoking status: Current Every Day Smoker     Packs/day: 1.00     Years: 40.00     Pack years: 40.00     Types: Cigarettes     Start date: 1981   • Smokeless tobacco: Never Used   • Tobacco comment: 2 per day   Vaping Use   • Vaping Use: Never used   Substance and Sexual Activity   • Alcohol use: Not Currently   • Drug use: Never   • Sexual activity: Defer       Family History   Problem Relation Age of Onset   • Breast cancer Mother    • Diabetes Mother    • Bipolar disorder Father    • Diabetes Father    • Hypertension Father    • Breast cancer Sister    • COPD Sister    • Diabetes Sister    • Hypertension Sister    • Alcohol abuse Brother    • Asthma Brother    • Bipolar disorder Brother    • Diabetes Brother    • Seizures Brother    • Breast cancer Maternal Grandmother    • Diabetes Maternal Grandmother    • Diabetes Maternal Grandfather    • Diabetes Paternal Grandmother    • Diabetes Paternal Grandfather    • Hypertension Son    • Kidney cancer Sister    • Hypertension Sister    • Diabetes Sister    • Malig Hyperthermia Neg Hx        Review of Systems   Constitutional: Positive for fatigue and unexpected weight change.   Respiratory:        Post-op pain   Neurological: Positive for weakness.   All other systems reviewed and are negative.       Objective:     Vitals:    05/02/22 0851 05/02/22 0901 05/02/22 1000 05/02/22 1030   BP:  104/57 90/47 96/51   BP Location:  Right arm Right arm    Patient Position:  Lying Lying    Pulse: 85 85 80 85   Resp:  20 18    Temp:  98.2 °F (36.8 °C)     TempSrc:  Oral     SpO2: 96% 97% 98% 98%   Weight:       Height:         Body mass index is 19.97 kg/m².  Flowsheet Rows    Flowsheet Row First Filed Value   Admission Height 162.6 cm (64\") Documented at 04/29/2022 0604   Admission Weight 53.3 kg (117 lb 8.1 oz) Documented at 04/29/2022 0604    "       Physical Exam  Constitutional:       General: She is not in acute distress.     Comments: Underweight, sitting in a chair   HENT:      Head: Normocephalic.      Nose: Nose normal.      Mouth/Throat:      Pharynx: Oropharynx is clear.   Eyes:      Conjunctiva/sclera: Conjunctivae normal.   Cardiovascular:      Rate and Rhythm: Normal rate and regular rhythm.      Pulses: Normal pulses.      Heart sounds: Normal heart sounds.   Pulmonary:      Breath sounds: Examination of the right-middle field reveals decreased breath sounds. Examination of the right-lower field reveals decreased breath sounds. Decreased breath sounds present.   Abdominal:      Palpations: Abdomen is soft.      Tenderness: There is abdominal tenderness (near tube insertion).   Musculoskeletal:         General: No swelling.   Skin:     Coloration: Skin is pale.   Neurological:      General: No focal deficit present.   Psychiatric:         Mood and Affect: Mood normal.                 Lab Review:                Results from last 7 days   Lab Units 05/01/22  0352   SODIUM mmol/L 141   POTASSIUM mmol/L 4.2   CHLORIDE mmol/L 107   CO2 mmol/L 23.0   BUN mg/dL 18   CREATININE mg/dL 0.38*   GLUCOSE mg/dL 91   CALCIUM mg/dL 8.3*         Results from last 7 days   Lab Units 05/01/22  0352   WBC 10*3/mm3 12.87*   HEMOGLOBIN g/dL 10.7*   HEMATOCRIT % 32.4*   PLATELETS 10*3/mm3 177                                                   I personally viewed and interpreted the patient's EKG/Telemetry data    Assessment/Plan:     1. Esophageal cancer POD#3 VATS, decortication right lung, robot-assisted total esophagectomy, J-tube    2. Known diagnosis of paroxysmal atrial fibrillation    3. Hyperthyroidism     *Continue metoprolol tartrate (was on succinate at home, but has a J-tube now)  *Continue methimazole, consider increasing to 10mg BID (TSH low, but free T4 WNL)  *Hold full dose AC until okay with Ani Finch and Pradip  *Watch rhythm on tele, no cardiac  testing needed right now              Electronically signed by Jasiel Nj MD at 05/02/22 5682

## 2024-06-05 NOTE — ED ADULT NURSE REASSESSMENT NOTE - NS ED NURSE REASSESS COMMENT FT1
Patient discharge home. Tele psych was able to get an appointment for patient at [place] [address] Director Margy Guerrier, LCSW-R. Discharge instruction given. Patient left in stable condition.
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
## 2024-06-26 ENCOUNTER — APPOINTMENT (OUTPATIENT)
Dept: PHYSICAL MEDICINE AND REHAB | Facility: CLINIC | Age: 49
End: 2024-06-26
Payer: COMMERCIAL

## 2024-06-26 VITALS
DIASTOLIC BLOOD PRESSURE: 77 MMHG | BODY MASS INDEX: 24.92 KG/M2 | HEIGHT: 61 IN | WEIGHT: 132 LBS | SYSTOLIC BLOOD PRESSURE: 127 MMHG | RESPIRATION RATE: 14 BRPM | HEART RATE: 52 BPM

## 2024-06-26 PROCEDURE — G2211 COMPLEX E/M VISIT ADD ON: CPT

## 2024-06-26 PROCEDURE — 99214 OFFICE O/P EST MOD 30 MIN: CPT

## 2024-06-26 RX ORDER — METHOCARBAMOL 750 MG/1
750 TABLET, FILM COATED ORAL
Qty: 60 | Refills: 0 | Status: ACTIVE | COMMUNITY
Start: 2023-12-06 | End: 1900-01-01

## 2024-06-26 RX ORDER — GABAPENTIN 300 MG/1
300 CAPSULE ORAL 3 TIMES DAILY
Qty: 90 | Refills: 1 | Status: ACTIVE | COMMUNITY
Start: 2024-05-08 | End: 1900-01-01

## 2024-07-02 ENCOUNTER — APPOINTMENT (OUTPATIENT)
Dept: ORTHOPEDIC SURGERY | Facility: CLINIC | Age: 49
End: 2024-07-02
Payer: MEDICAID

## 2024-07-02 VITALS
WEIGHT: 132 LBS | DIASTOLIC BLOOD PRESSURE: 80 MMHG | HEIGHT: 61 IN | HEART RATE: 58 BPM | SYSTOLIC BLOOD PRESSURE: 130 MMHG | BODY MASS INDEX: 24.92 KG/M2

## 2024-07-02 DIAGNOSIS — M47.816 SPONDYLOSIS W/OUT MYELOPATHY OR RADICULOPATHY, LUMBAR REGION: ICD-10-CM

## 2024-07-02 DIAGNOSIS — M54.16 RADICULOPATHY, LUMBAR REGION: ICD-10-CM

## 2024-07-02 PROCEDURE — 99204 OFFICE O/P NEW MOD 45 MIN: CPT

## 2024-07-31 ENCOUNTER — APPOINTMENT (OUTPATIENT)
Dept: PHYSICAL MEDICINE AND REHAB | Facility: CLINIC | Age: 49
End: 2024-07-31
Payer: OTHER MISCELLANEOUS

## 2024-07-31 VITALS
RESPIRATION RATE: 15 BRPM | HEIGHT: 61 IN | SYSTOLIC BLOOD PRESSURE: 124 MMHG | BODY MASS INDEX: 25.3 KG/M2 | OXYGEN SATURATION: 100 % | WEIGHT: 134 LBS | DIASTOLIC BLOOD PRESSURE: 79 MMHG | HEART RATE: 61 BPM

## 2024-07-31 DIAGNOSIS — M54.16 RADICULOPATHY, LUMBAR REGION: ICD-10-CM

## 2024-07-31 DIAGNOSIS — M47.816 SPONDYLOSIS W/OUT MYELOPATHY OR RADICULOPATHY, LUMBAR REGION: ICD-10-CM

## 2024-07-31 PROCEDURE — 99214 OFFICE O/P EST MOD 30 MIN: CPT

## 2024-07-31 NOTE — HISTORY OF PRESENT ILLNESS
[FreeTextEntry1] : Ms. CARLEE LOBATO is a 48 year old female who presents with low back pain.   Location: Left lower back Onset: Chronic but worsened at 10/25/23, was doing an intake and went to stand up and felt immediate sharp pain in L lower back for which pain severely worsened. She stood for the rest of the day but then pain persisted. Went to ER that day and was discharged with outpatient f/u. Then she went back 10/27/23 due to severe pain.  Provocation/Palliative: Worse with sitting and laying flat, better with standing Quality: Shooting Radiation: Moderately down LLE>>RLE Severity: Moderate-severe Timing: Not improved with time   Does admit to some tingling down her LLE.  Does admit to L leg weakness although somewhat improved over last few weeks. Denies any loss of bowel/bladder control or any groin numbness. Previous medications trialed: Gabapentin 300/600, Methocarbamol  Previous procedures relevant to complaint: None Conservative therapy tried?: She is currently in PT   She has seen Ortho Spine Dr. Cline who said she is a candidate for surgery but she deferred for now

## 2024-07-31 NOTE — PHYSICAL EXAM
[FreeTextEntry1] : PE: Constitutional: Appears comfortable, calm and cooperative MSK (Back)  Inspection: no gross swelling identified  Palpation: Mild tenderness of the left>right lower lumbar paraspinals  ROM: Able to flex 70degrees but limited due to pain, able to extend 15 degrees without significant additional pain  Strength: 5/5 strength in bilateral LE except for L ankle dorsiflexion and L EHL which is 4+/5  Reflexes: 2+ Patella reflex bilaterally, 2+ Achilles reflex bilaterally, negative clonus bilaterally  Sensation: Intact to light touch in bilateral lower extremities except for slightly decreased sensation to light touch over dorsum of L big toe Special tests: SLR: positive on left, negative on right.

## 2024-07-31 NOTE — ASSESSMENT
[Physical Disability Temporary Total] : temporarily total disabled [FreeTextEntry1] : Ms. CARLEE LOBATO is a 48 year old female who presented with severe L sided low back pain with pain down her LLE, consistent with a lumbar radiculopathy. Pain worsened at work in 10/2023.  Denies any red flag signs at this time. Will recommend: - Continue PT 2-3x/week for stretching, strengthening (especially of core muscles), ROM exercises, HEP and modalities PRN including myofascial release, moist heat - She will restart Gabapentin 300/600 as she tolerated it well and she has significant radicular pain. Patient also advised to not stop abruptly. Patient aware of side effects and risks including sedation, leg swelling, and possible mood changes although she did not experience them. -Continue occasional Methocarbamol 750mg Qhs PRN. Advised of side effects including sedation. - Discussed with patient the risks (including but not limited to bleeding, elevated blood sugar, allergic reaction, infection, nerve damage, etc), benefits and alternatives to an epidural steroid injection for which patient understands but would like to hold off for now. - F/u with Ortho Spine as recommended  RTC in 1 month. Patient aware of red flag signs including any changes to their bowel/bladder control, groin numbness or new weakness. Patient knows to seek immediate attention by calling 911 or going to nearest ER if these symptoms appear.  This patient is being managed for a complex chronic pain that requires ongoing medical management. The nature of this condition requires a longitudinal relationship and monitoring over time for appropriate treatment.

## 2024-08-30 ENCOUNTER — APPOINTMENT (OUTPATIENT)
Dept: PHYSICAL MEDICINE AND REHAB | Facility: CLINIC | Age: 49
End: 2024-08-30
Payer: OTHER MISCELLANEOUS

## 2024-08-30 VITALS
WEIGHT: 135 LBS | HEART RATE: 58 BPM | BODY MASS INDEX: 25.49 KG/M2 | SYSTOLIC BLOOD PRESSURE: 126 MMHG | RESPIRATION RATE: 15 BRPM | DIASTOLIC BLOOD PRESSURE: 74 MMHG | HEIGHT: 61 IN

## 2024-08-30 DIAGNOSIS — M47.816 SPONDYLOSIS W/OUT MYELOPATHY OR RADICULOPATHY, LUMBAR REGION: ICD-10-CM

## 2024-08-30 DIAGNOSIS — M54.16 RADICULOPATHY, LUMBAR REGION: ICD-10-CM

## 2024-08-30 PROCEDURE — 99214 OFFICE O/P EST MOD 30 MIN: CPT

## 2024-08-30 NOTE — ASSESSMENT
[FreeTextEntry1] : Ms. CARLEE LOBATO is a 48 year old female who presented with severe L sided low back pain with pain down her LLE, consistent with a lumbar radiculopathy. Pain worsened at work in 10/2023. Denies any red flag signs at this time. Will recommend: - Advised patient to restart PT 2-3x/week for stretching, strengthening (especially of core muscles), ROM exercises, HEP and modalities PRN including myofascial release, moist heat - She will continue Gabapentin 300/600 as she tolerated it well and she has significant radicular pain. Patient also advised to not stop abruptly. Patient aware of side effects and risks including sedation, leg swelling, and possible mood changes although she did not experience them. -Continue occasional Methocarbamol 750mg Qhs PRN. Advised of side effects including sedation. - Discussed with patient the risks (including but not limited to bleeding, elevated blood sugar, allergic reaction, infection, nerve damage, etc), benefits and alternatives to an epidural steroid injection for which patient understands but would like to hold off for now. - F/u with Ortho Spine as recommended  RTC in 1 month. Patient aware of red flag signs including any changes to their bowel/bladder control, groin numbness or new weakness. Patient knows to seek immediate attention by calling 911 or going to nearest ER if these symptoms appear.  This patient is being managed for a complex chronic pain that requires ongoing medical management. The nature of this condition requires a longitudinal relationship and monitoring over time for appropriate treatment.     Disability Status: Patient is temporarily total disabled.

## 2024-08-30 NOTE — PHYSICAL EXAM
[FreeTextEntry1] : PE: Constitutional: Appears comfortable, calm and cooperative MSK (Back)  Inspection: no gross swelling identified  Palpation: Mild tenderness of the left>right lower lumbar paraspinals  ROM: Able to flex 80 degrees but limited due to pain, able to extend 15 degrees without significant additional pain  Strength: 5/5 strength in bilateral LE except for L ankle dorsiflexion and L EHL which is 4+/5  Reflexes: 2+ Patella reflex bilaterally, 2+ Achilles reflex bilaterally, negative clonus bilaterally  Sensation: Intact to light touch in bilateral lower extremities except for slightly decreased sensation to light touch over dorsum of L big toe Special tests: SLR: positive on left, negative on right.

## 2024-08-30 NOTE — HISTORY OF PRESENT ILLNESS
[FreeTextEntry1] : Ms. CARLEE LOBATO is a 48 year old female who presents for follow up. At last visit, she was continued on PT, restarted on Gabapentin 300/600, given Robaxin, discussed an GREGORIO and told to f/u with Ortho Spine as recommended. Overall she is doing about the same. She could not continue PT due to insurance issues. She is taking Robaxin BID with some relief.    Location: Left>Right lower back Onset: Chronic but worsened at 10/25/23, was doing an intake and went to stand up and felt immediate sharp pain in L lower back for which pain severely worsened. She stood for the rest of the day but then pain persisted. Went to ER that day and was discharged with outpatient f/u. Then she went back 10/27/23 due to severe pain. Provocation/Palliative: Worse with sitting and laying flat, better with standing Quality: Shooting Radiation: Moderately down LLE>>RLE Severity: Moderate Timing: Has been stable  No bowel/bladder changes. No groin numbness.

## 2024-09-16 ENCOUNTER — APPOINTMENT (OUTPATIENT)
Dept: ULTRASOUND IMAGING | Facility: CLINIC | Age: 49
End: 2024-09-16
Payer: MEDICAID

## 2024-09-16 ENCOUNTER — OUTPATIENT (OUTPATIENT)
Dept: OUTPATIENT SERVICES | Facility: HOSPITAL | Age: 49
LOS: 1 days | End: 2024-09-16

## 2024-09-16 DIAGNOSIS — Z00.00 ENCOUNTER FOR GENERAL ADULT MEDICAL EXAMINATION WITHOUT ABNORMAL FINDINGS: ICD-10-CM

## 2024-09-16 PROCEDURE — 76882 US LMTD JT/FCL EVL NVASC XTR: CPT | Mod: 26,RT

## 2024-10-16 ENCOUNTER — APPOINTMENT (OUTPATIENT)
Dept: PHYSICAL MEDICINE AND REHAB | Facility: CLINIC | Age: 49
End: 2024-10-16
Payer: MEDICAID

## 2024-10-16 PROCEDURE — 99442: CPT

## 2024-10-23 ENCOUNTER — APPOINTMENT (OUTPATIENT)
Dept: PHYSICAL MEDICINE AND REHAB | Facility: CLINIC | Age: 49
End: 2024-10-23
Payer: OTHER MISCELLANEOUS

## 2024-10-23 VITALS
HEART RATE: 65 BPM | SYSTOLIC BLOOD PRESSURE: 120 MMHG | HEIGHT: 61 IN | DIASTOLIC BLOOD PRESSURE: 74 MMHG | BODY MASS INDEX: 25.3 KG/M2 | RESPIRATION RATE: 15 BRPM | WEIGHT: 134 LBS

## 2024-10-23 DIAGNOSIS — M47.816 SPONDYLOSIS W/OUT MYELOPATHY OR RADICULOPATHY, LUMBAR REGION: ICD-10-CM

## 2024-10-23 DIAGNOSIS — M54.16 RADICULOPATHY, LUMBAR REGION: ICD-10-CM

## 2024-10-23 PROCEDURE — 99214 OFFICE O/P EST MOD 30 MIN: CPT

## 2024-10-23 RX ORDER — DULOXETINE HYDROCHLORIDE 60 MG/1
60 CAPSULE, DELAYED RELEASE PELLETS ORAL
Refills: 0 | Status: ACTIVE | COMMUNITY

## 2024-10-28 NOTE — ED ADULT NURSE NOTE - NS ED NURSE LEVEL OF CONSCIOUSNESS AFFECT
You are scheduled for C5-6, C6-7 ADR on 11/12/2024 with Dr. Gerard Cid at [facility].     Pre-op instructions are as follows below:     You will need to contact the Pre-admission department at [phone]. You will speak with a nurse who will review your health history and give you information from the hospital. The nurse will also get you scheduled for all your pre-op testing required for your surgery. If they do not answer when you call please leave a message and they will call you back, they return calls in surgical order, it may be a few days before they return your call.     No blood thinning medications, over the counter non-steroidal antiinflammatories, herbal supplements (garlic,tumeric etc.), vitamin E, fish oil or krill oil for at least 7-14 days prior to surgery.      You may only take Tylenol, Extra Strength Tylenol, Arthritis Tylenol, or prescription Norco or Tramadol for pain if something is needed.     You will need to prep your skin using Hibiclens. Detailed instructions are provided below. Your showers are to start 5 days prior to your scheduled surgery. The last shower should be the night before surgery.      You should have nothing to eat or drink after 11:00pm the night prior to surgery except for the following:     Do drink 12 ounces of regular Gatorade (NOT RED) 12 hours and 4 hours prior to your scheduled surgery time, Do not drink any other liquids (including water) before your surgery. Do not chew gum or eat candies before surgery.  Take 1000 mg of Tylenol (Acetaminophen) 4 hours before your scheduled surgery time, take this with your scheduled Gatorade.     Our office will get authorization for surgery. We will attempt to contact you 3 days before surgery if we run into any complications or have not received your surgery authorization. We will continue to attempt to get authorization until 2pm the day before surgery. If authorization is not received we will give you a call to discuss  next steps. Our goal is to make sure you do not proceed with an un-authorized surgery so that you do not end up having to pay for this surgery out of pocket.     Surgery is usually scheduled as 23 hour admission.  This is an estimate and varies from person to person.  Ultimately, the surgeon will determine when you are ready to be discharged.     PCP clearance is needed.  Our office will send a letter to your PCP, Dr. Warren Powell. Please contact their office for appointment. Please make sure this appointment is completed at least 1 week prior to surgical date     The hospital will contact you 1-2 days before surgery with your arrival time.     Post operative you can move your neck as usual, refrain from excessive range of motion, you cannot drive for the first week post operative. No heavy lifting for one week.   If you were on blood thinners (such as Coumadin, Pradaxa, Xarelto, etc) prior to surgery that we had you stop for surgery, please make sure you get instructions about when to resume the medication before you are discharged from the hospital     Per Dr. Cid's surgery protocol your appointments are as follows:      2 week pre-op surgical telehealth/phone appointment is on 10/29/24 at 9:00 am with Shantel Garcia RN     1 week pre-op surgical review scheduled on 11/4/2024 at 10:40 am with Dr. Cid.     1 week post-op appointment scheduled on 11/18/2024 at 9:00 am with GUERO Hernandez     4 week post-op appointment scheduled on 12/9/2024 at 11:00 am with Dr. Cid     3 month post surgery appointment needs to be scheduled while at office for your 4 week follow up visit.        Please make sure to arrive at least 15 minutes prior to your scheduled appointment in order to get checked in and roomed in a timely manner.  Depending on provider availability, late patients may be required to reschedule.  REFILLS:  After surgery, please remember that we do have a 48 hour refill policy that does not include weekends,  please make sure to request your medications in a timely manner so that you do not go days without medication.     *Refills should be requested through your pharmacy or through the refill request in [employer] (log in, go to medications, then select refill request).     CANDDi MESSAGING:  Please remember that our office is closed during the weekend and no one is available for [employer] messages. If you have an urgent or emergent matter please go to a walk-in center or the emergency room. Also please remember your [employer] messages are part of your legal medical record and should not be utilized as a personal email with our providers as it is visible to all [facility] employees. Also, Since [employer] messages are not for emergent matters it may be several days before there is a response to your message.     FMLA/PAPERWORK COMPLETED BY OUR MEDICAL FORMS DEPARTMENT:  If you require FMLA paperwork for your surgery, please make sure to either Drop it off or have it faxed to our office at [phone]. Make sure it has your NAME, , and has your signature. You will need to have a Release of Information on file. To facilitate this process we ask that you requested it at the  on your way out and sign it. Without a signed SRINIVAS or signature on the form we will not be able to fax it and this will cause a delay with your forms. Fees charged for forms are $25 for initial submission and $15 for recertifications. If you have questions on the status of your forms please call the forms department at [phone].  **We do have a 3 week policy for all forms and paperwork, please make sure to allow plenty of time for completion. Same day paperwork will not be completed. **        Spine Navigator  You will have a 30 minute telehealth/phone visit with our spine navigator approximately 2 weeks before your surgery. This visit is NOT optional. This appointment will get booked when you schedule your spinal surgery.  When speaking  Calm with Pre-admissions you will be told about a spine class as it relates to your surgery, this is an OPTIONAL class. The same or similar information will be discussed with you during your telehealth appointment with the spine navigator (Spine Navigator appointment is not optional). However, if you would like to have this information repeated to you or if you would feel more comfortable with additional information, you can elect to schedule this class during your pre-admissions phone call.  The Pre-op Spine Surgery Class is held at [facility] most Wednesdays from 3:30-4:30 pm. Call Pre-admission Testing (PAT) to register at:  [phone]. Please park in the [facility] garage, check in at registration and meet in the [facility] lobby for your escort to class on the Ortho Spine unit. If unable to attend, but would like additional information, the class is available online at [email or web address].      Please visit the following website for the detailed and up-to-date visitor screening and restriction policy at [email or web address] [email or web address].        For Office Use Only:  Medical Clearance Requested: PCP  PA:[facility]  CPT Codes:

## 2024-11-27 ENCOUNTER — APPOINTMENT (OUTPATIENT)
Dept: ORTHOPEDIC SURGERY | Facility: CLINIC | Age: 49
End: 2024-11-27
Payer: MEDICAID

## 2024-11-27 VITALS
SYSTOLIC BLOOD PRESSURE: 162 MMHG | BODY MASS INDEX: 25.3 KG/M2 | WEIGHT: 134 LBS | DIASTOLIC BLOOD PRESSURE: 78 MMHG | HEIGHT: 61 IN | HEART RATE: 56 BPM

## 2024-11-27 DIAGNOSIS — M54.16 RADICULOPATHY, LUMBAR REGION: ICD-10-CM

## 2024-11-27 DIAGNOSIS — M47.816 SPONDYLOSIS W/OUT MYELOPATHY OR RADICULOPATHY, LUMBAR REGION: ICD-10-CM

## 2024-11-27 PROCEDURE — 99204 OFFICE O/P NEW MOD 45 MIN: CPT

## 2024-12-20 ENCOUNTER — APPOINTMENT (OUTPATIENT)
Dept: PHYSICAL MEDICINE AND REHAB | Facility: CLINIC | Age: 49
End: 2024-12-20
Payer: OTHER MISCELLANEOUS

## 2024-12-20 VITALS
BODY MASS INDEX: 25.3 KG/M2 | SYSTOLIC BLOOD PRESSURE: 136 MMHG | DIASTOLIC BLOOD PRESSURE: 90 MMHG | HEART RATE: 65 BPM | HEIGHT: 61 IN | RESPIRATION RATE: 14 BRPM | WEIGHT: 134 LBS

## 2024-12-20 DIAGNOSIS — M54.16 RADICULOPATHY, LUMBAR REGION: ICD-10-CM

## 2024-12-20 DIAGNOSIS — M47.816 SPONDYLOSIS W/OUT MYELOPATHY OR RADICULOPATHY, LUMBAR REGION: ICD-10-CM

## 2024-12-20 PROCEDURE — 99214 OFFICE O/P EST MOD 30 MIN: CPT

## 2024-12-20 PROCEDURE — G2211 COMPLEX E/M VISIT ADD ON: CPT | Mod: NC

## 2024-12-25 NOTE — ED ADULT NURSE NOTE - PLAN OF CARE DISCUSSED WITH:
Pt resting, able to obtain O2 sats and HR, pt compliant with BP vitals    Quality 130: Documentation Of Current Medications In The Medical Record: Current Medications Documented Detail Level: Detailed Family/Patient

## 2025-01-03 ENCOUNTER — RX RENEWAL (OUTPATIENT)
Age: 50
End: 2025-01-03

## 2025-01-09 ENCOUNTER — NON-APPOINTMENT (OUTPATIENT)
Age: 50
End: 2025-01-09

## 2025-01-16 ENCOUNTER — APPOINTMENT (OUTPATIENT)
Dept: PHYSICAL MEDICINE AND REHAB | Facility: AMBULATORY SURGERY CENTER | Age: 50
End: 2025-01-16

## 2025-02-13 ENCOUNTER — APPOINTMENT (OUTPATIENT)
Dept: PHYSICAL MEDICINE AND REHAB | Facility: AMBULATORY SURGERY CENTER | Age: 50
End: 2025-02-13

## 2025-02-28 ENCOUNTER — APPOINTMENT (OUTPATIENT)
Dept: OBGYN | Facility: CLINIC | Age: 50
End: 2025-02-28
Payer: MEDICAID

## 2025-02-28 VITALS
DIASTOLIC BLOOD PRESSURE: 77 MMHG | HEIGHT: 61 IN | BODY MASS INDEX: 25.3 KG/M2 | SYSTOLIC BLOOD PRESSURE: 128 MMHG | WEIGHT: 134 LBS

## 2025-02-28 DIAGNOSIS — N83.209 UNSPECIFIED OVARIAN CYST, UNSPECIFIED SIDE: ICD-10-CM

## 2025-02-28 DIAGNOSIS — N94.3 PREMENSTRUAL TENSION SYNDROME: ICD-10-CM

## 2025-02-28 DIAGNOSIS — R10.2 PELVIC AND PERINEAL PAIN: ICD-10-CM

## 2025-02-28 PROCEDURE — 99214 OFFICE O/P EST MOD 30 MIN: CPT | Mod: 25

## 2025-02-28 PROCEDURE — 99406 BEHAV CHNG SMOKING 3-10 MIN: CPT

## 2025-03-03 PROBLEM — N94.3 PREMENSTRUAL SYMPTOM: Status: ACTIVE | Noted: 2025-03-03

## 2025-03-03 RX ORDER — MEDROXYPROGESTERONE ACETATE 150 MG/ML
150 INJECTION, SUSPENSION INTRAMUSCULAR
Qty: 1 | Refills: 3 | Status: ACTIVE | COMMUNITY
Start: 2025-03-03 | End: 1900-01-01

## 2025-03-06 ENCOUNTER — APPOINTMENT (OUTPATIENT)
Dept: PHYSICAL MEDICINE AND REHAB | Facility: AMBULATORY SURGERY CENTER | Age: 50
End: 2025-03-06
Payer: OTHER MISCELLANEOUS

## 2025-03-06 DIAGNOSIS — M54.16 RADICULOPATHY, LUMBAR REGION: ICD-10-CM

## 2025-03-06 PROCEDURE — 64483 NJX AA&/STRD TFRM EPI L/S 1: CPT | Mod: LT

## 2025-03-20 ENCOUNTER — NON-APPOINTMENT (OUTPATIENT)
Age: 50
End: 2025-03-20

## 2025-03-21 ENCOUNTER — APPOINTMENT (OUTPATIENT)
Dept: PHYSICAL MEDICINE AND REHAB | Facility: CLINIC | Age: 50
End: 2025-03-21
Payer: OTHER MISCELLANEOUS

## 2025-03-21 VITALS
SYSTOLIC BLOOD PRESSURE: 132 MMHG | WEIGHT: 134 LBS | HEART RATE: 70 BPM | RESPIRATION RATE: 15 BRPM | BODY MASS INDEX: 25.3 KG/M2 | HEIGHT: 61 IN | DIASTOLIC BLOOD PRESSURE: 89 MMHG

## 2025-03-21 DIAGNOSIS — M47.816 SPONDYLOSIS W/OUT MYELOPATHY OR RADICULOPATHY, LUMBAR REGION: ICD-10-CM

## 2025-03-21 DIAGNOSIS — M54.16 RADICULOPATHY, LUMBAR REGION: ICD-10-CM

## 2025-03-21 PROCEDURE — G2211 COMPLEX E/M VISIT ADD ON: CPT | Mod: NC

## 2025-03-21 PROCEDURE — 99214 OFFICE O/P EST MOD 30 MIN: CPT

## 2025-03-28 ENCOUNTER — APPOINTMENT (OUTPATIENT)
Dept: OBGYN | Facility: CLINIC | Age: 50
End: 2025-03-28

## 2025-03-28 VITALS
DIASTOLIC BLOOD PRESSURE: 54 MMHG | BODY MASS INDEX: 25.36 KG/M2 | SYSTOLIC BLOOD PRESSURE: 92 MMHG | WEIGHT: 134.3 LBS | HEIGHT: 61 IN

## 2025-03-28 DIAGNOSIS — N94.3 PREMENSTRUAL TENSION SYNDROME: ICD-10-CM

## 2025-03-28 DIAGNOSIS — Z01.411 ENCOUNTER FOR GYNECOLOGICAL EXAMINATION (GENERAL) (ROUTINE) WITH ABNORMAL FINDINGS: ICD-10-CM

## 2025-03-28 DIAGNOSIS — Z12.4 ENCOUNTER FOR SCREENING FOR MALIGNANT NEOPLASM OF CERVIX: ICD-10-CM

## 2025-03-28 DIAGNOSIS — Z12.39 ENCOUNTER FOR OTHER SCREENING FOR MALIGNANT NEOPLASM OF BREAST: ICD-10-CM

## 2025-03-28 DIAGNOSIS — Z30.42 ENCOUNTER FOR SURVEILLANCE OF INJECTABLE CONTRACEPTIVE: ICD-10-CM

## 2025-03-28 PROCEDURE — 96372 THER/PROPH/DIAG INJ SC/IM: CPT

## 2025-03-28 PROCEDURE — 99459 PELVIC EXAMINATION: CPT

## 2025-03-28 PROCEDURE — 81025 URINE PREGNANCY TEST: CPT

## 2025-03-28 PROCEDURE — 99212 OFFICE O/P EST SF 10 MIN: CPT | Mod: 25

## 2025-03-28 PROCEDURE — 99396 PREV VISIT EST AGE 40-64: CPT

## 2025-03-31 LAB
C TRACH RRNA SPEC QL NAA+PROBE: NOT DETECTED
HPV HIGH+LOW RISK DNA PNL CVX: NOT DETECTED
N GONORRHOEA RRNA SPEC QL NAA+PROBE: NOT DETECTED
SOURCE TP AMPLIFICATION: NORMAL

## 2025-04-03 LAB — CYTOLOGY CVX/VAG DOC THIN PREP: NORMAL

## 2025-05-30 ENCOUNTER — APPOINTMENT (OUTPATIENT)
Dept: PHYSICAL MEDICINE AND REHAB | Facility: CLINIC | Age: 50
End: 2025-05-30
Payer: OTHER MISCELLANEOUS

## 2025-05-30 VITALS
BODY MASS INDEX: 24.35 KG/M2 | RESPIRATION RATE: 15 BRPM | HEART RATE: 62 BPM | WEIGHT: 129 LBS | SYSTOLIC BLOOD PRESSURE: 120 MMHG | HEIGHT: 61 IN | DIASTOLIC BLOOD PRESSURE: 73 MMHG

## 2025-05-30 DIAGNOSIS — M47.816 SPONDYLOSIS W/OUT MYELOPATHY OR RADICULOPATHY, LUMBAR REGION: ICD-10-CM

## 2025-05-30 DIAGNOSIS — M54.16 RADICULOPATHY, LUMBAR REGION: ICD-10-CM

## 2025-05-30 PROCEDURE — G2211 COMPLEX E/M VISIT ADD ON: CPT | Mod: NC

## 2025-05-30 PROCEDURE — 99214 OFFICE O/P EST MOD 30 MIN: CPT

## 2025-06-06 ENCOUNTER — APPOINTMENT (OUTPATIENT)
Dept: OBGYN | Facility: CLINIC | Age: 50
End: 2025-06-06
Payer: MEDICAID

## 2025-06-06 ENCOUNTER — LABORATORY RESULT (OUTPATIENT)
Age: 50
End: 2025-06-06

## 2025-06-06 VITALS
SYSTOLIC BLOOD PRESSURE: 108 MMHG | WEIGHT: 138.5 LBS | BODY MASS INDEX: 26.15 KG/M2 | HEIGHT: 61 IN | DIASTOLIC BLOOD PRESSURE: 68 MMHG

## 2025-06-06 LAB
HCG UR QL: NEGATIVE
QUALITY CONTROL: YES

## 2025-06-06 PROCEDURE — 96372 THER/PROPH/DIAG INJ SC/IM: CPT

## 2025-06-06 PROCEDURE — 81025 URINE PREGNANCY TEST: CPT

## 2025-06-06 PROCEDURE — 99213 OFFICE O/P EST LOW 20 MIN: CPT | Mod: 25

## 2025-06-18 ENCOUNTER — APPOINTMENT (OUTPATIENT)
Dept: NEUROLOGY | Facility: CLINIC | Age: 50
End: 2025-06-18
Payer: MEDICAID

## 2025-06-18 VITALS
WEIGHT: 138 LBS | BODY MASS INDEX: 26.06 KG/M2 | DIASTOLIC BLOOD PRESSURE: 77 MMHG | HEIGHT: 61 IN | SYSTOLIC BLOOD PRESSURE: 127 MMHG

## 2025-06-18 PROBLEM — R41.0 CONFUSION: Status: ACTIVE | Noted: 2025-06-18

## 2025-06-18 PROCEDURE — 99204 OFFICE O/P NEW MOD 45 MIN: CPT

## 2025-06-27 ENCOUNTER — NON-APPOINTMENT (OUTPATIENT)
Age: 50
End: 2025-06-27

## 2025-07-11 ENCOUNTER — APPOINTMENT (OUTPATIENT)
Dept: PHYSICAL MEDICINE AND REHAB | Facility: CLINIC | Age: 50
End: 2025-07-11
Payer: OTHER MISCELLANEOUS

## 2025-07-11 VITALS
BODY MASS INDEX: 26.24 KG/M2 | HEART RATE: 62 BPM | WEIGHT: 139 LBS | RESPIRATION RATE: 15 BRPM | HEIGHT: 61 IN | DIASTOLIC BLOOD PRESSURE: 61 MMHG | SYSTOLIC BLOOD PRESSURE: 127 MMHG

## 2025-07-11 PROBLEM — Z59.01 SHELTERED HOMELESSNESS: Status: ACTIVE | Noted: 2025-07-11

## 2025-07-11 PROCEDURE — G2211 COMPLEX E/M VISIT ADD ON: CPT | Mod: NC

## 2025-07-11 PROCEDURE — 99214 OFFICE O/P EST MOD 30 MIN: CPT

## 2025-08-29 ENCOUNTER — APPOINTMENT (OUTPATIENT)
Dept: PHYSICAL MEDICINE AND REHAB | Facility: CLINIC | Age: 50
End: 2025-08-29
Payer: OTHER MISCELLANEOUS

## 2025-08-29 VITALS
HEART RATE: 75 BPM | SYSTOLIC BLOOD PRESSURE: 118 MMHG | DIASTOLIC BLOOD PRESSURE: 70 MMHG | HEIGHT: 61 IN | BODY MASS INDEX: 27.38 KG/M2 | RESPIRATION RATE: 15 BRPM | WEIGHT: 145 LBS

## 2025-08-29 DIAGNOSIS — M54.16 RADICULOPATHY, LUMBAR REGION: ICD-10-CM

## 2025-08-29 DIAGNOSIS — M47.816 SPONDYLOSIS W/OUT MYELOPATHY OR RADICULOPATHY, LUMBAR REGION: ICD-10-CM

## 2025-08-29 PROCEDURE — 99214 OFFICE O/P EST MOD 30 MIN: CPT

## 2025-08-29 PROCEDURE — G2211 COMPLEX E/M VISIT ADD ON: CPT | Mod: NC

## 2025-09-04 ENCOUNTER — APPOINTMENT (OUTPATIENT)
Dept: NEUROLOGY | Facility: CLINIC | Age: 50
End: 2025-09-04
Payer: MEDICAID

## 2025-09-04 PROCEDURE — 93040 RHYTHM ECG WITH REPORT: CPT

## 2025-09-04 PROCEDURE — 95819 EEG AWAKE AND ASLEEP: CPT
